# Patient Record
Sex: FEMALE | Race: WHITE | HISPANIC OR LATINO | Employment: OTHER | URBAN - METROPOLITAN AREA
[De-identification: names, ages, dates, MRNs, and addresses within clinical notes are randomized per-mention and may not be internally consistent; named-entity substitution may affect disease eponyms.]

---

## 2017-09-16 ENCOUNTER — OFFICE VISIT (OUTPATIENT)
Dept: URGENT CARE | Facility: URGENT CARE | Age: 52
End: 2017-09-16
Payer: COMMERCIAL

## 2017-09-16 VITALS
HEART RATE: 69 BPM | WEIGHT: 203 LBS | BODY MASS INDEX: 34.66 KG/M2 | OXYGEN SATURATION: 97 % | TEMPERATURE: 98.7 F | SYSTOLIC BLOOD PRESSURE: 186 MMHG | HEIGHT: 64 IN | DIASTOLIC BLOOD PRESSURE: 106 MMHG

## 2017-09-16 DIAGNOSIS — J30.1 ACUTE SEASONAL ALLERGIC RHINITIS DUE TO POLLEN: Primary | ICD-10-CM

## 2017-09-16 DIAGNOSIS — I10 ESSENTIAL HYPERTENSION: ICD-10-CM

## 2017-09-16 PROCEDURE — 99213 OFFICE O/P EST LOW 20 MIN: CPT | Performed by: PHYSICIAN ASSISTANT

## 2017-09-16 RX ORDER — CETIRIZINE HYDROCHLORIDE 10 MG/1
10 TABLET ORAL DAILY PRN
Qty: 30 TABLET | Refills: 11 | Status: SHIPPED | OUTPATIENT
Start: 2017-09-16 | End: 2018-10-10

## 2017-09-16 RX ORDER — LISINOPRIL 20 MG/1
20 TABLET ORAL DAILY
Qty: 30 TABLET | Refills: 11 | Status: SHIPPED | OUTPATIENT
Start: 2017-09-16 | End: 2018-10-01

## 2017-09-16 RX ORDER — FLUTICASONE PROPIONATE 50 MCG
2 SPRAY, SUSPENSION (ML) NASAL DAILY
Qty: 1 BOTTLE | Refills: 11 | Status: SHIPPED | OUTPATIENT
Start: 2017-09-16 | End: 2018-10-10

## 2017-09-16 NOTE — MR AVS SNAPSHOT
"              After Visit Summary   9/16/2017    Catia James    MRN: [id number]           Patient Information     Date Of Birth          1965        Visit Information        Provider Department      9/16/2017 7:20 PM Alejandro Trevino PA-C [facility]        Today's Diagnoses     Acute seasonal allergic rhinitis due to pollen    -  1    Essential hypertension           Follow-ups after your visit        Who to contact     If you have questions or need follow up information about today's clinic visit or your schedule please contact [facility] directly at [phone].  Normal or non-critical lab and imaging results will be communicated to you by Kognitiohart, letter or phone within 4 business days after the clinic has received the results. If you do not hear from us within 7 days, please contact the clinic through Kognitiohart or phone. If you have a critical or abnormal lab result, we will notify you by phone as soon as possible.  Submit refill requests through Insmed or call your pharmacy and they will forward the refill request to us. Please allow 3 business days for your refill to be completed.          Additional Information About Your Visit        MyChart Information     Insmed gives you secure access to your electronic health record. If you see a primary care provider, you can also send messages to your care team and make appointments. If you have questions, please call your primary care clinic.  If you do not have a primary care provider, please call [phone] and they will assist you.        Care EveryWhere ID     This is your Care EveryWhere ID. This could be used by other organizations to access your [place] medical records  [id number]        Your Vitals Were     Pulse Temperature Height Last Period Pulse Oximetry Breastfeeding?    69 98.7  F (37.1  C) (Oral) 5' 3.5\" (1.613 m) 08/03/2015 97% No    BMI (Body Mass Index)                   35.4 kg/m2         "    Blood Pressure from Last 3 Encounters:   09/16/17 (!) 186/106   09/08/15 (!) 138/110   12/17/14 (!) 142/98    Weight from Last 3 Encounters:   09/16/17 203 lb (92.1 kg)   09/08/15 203 lb 8 oz (92.3 kg)   12/17/14 194 lb (88 kg)              Today, you had the following     No orders found for display         Today's Medication Changes          These changes are accurate as of: 9/16/17  7:47 PM.  If you have any questions, ask your nurse or doctor.               Start taking these medicines.        Dose/Directions    lisinopril 20 MG tablet   Commonly known as:  PRINIVIL/ZESTRIL   Used for:  Acute seasonal allergic rhinitis due to pollen, Essential hypertension   Started by:  Alejandro Trevino PA-C        Dose:  20 mg   Take 1 tablet (20 mg) by mouth daily   Quantity:  30 tablet   Refills:  11         These medicines have changed or have updated prescriptions.        Dose/Directions    fluticasone 50 MCG/ACT spray   Commonly known as:  FLONASE   This may have changed:  when to take this   Used for:  Acute seasonal allergic rhinitis due to pollen   Changed by:  Alejandro Trevino PA-C        Dose:  2 spray   Spray 2 sprays into both nostrils daily   Quantity:  1 Bottle   Refills:  11            Where to get your medicines      These medications were sent to [facility] [address] [place] [address] & [address] [place] [id number]    Hours:  24-hours Phone:  [phone]     cetirizine 10 MG tablet    fluticasone 50 MCG/ACT spray    lisinopril 20 MG tablet                Primary Care Provider Office Phone # Fax #    Petty Joe Chun PA-C [phone] [address] [facility] [address]        Equal Access to Services     CONOR BOLES : Chloe Saldana, waaxda luqadaha, qaybta kaalmada adeegyada, cindy rodriguez. [facility] [phone].    ATENCIÓN: Si yury grant  lemus disposición servicios gratuitos de asistencia lingüística. Gautam polk [phone].    We comply with applicable federal civil rights laws and [place] laws. We do not discriminate on the basis of race, color, national origin, age, disability sex, sexual orientation or gender identity.            Thank you!     Thank you for choosing [facility]  for your care. Our goal is always to provide you with excellent care. Hearing back from our patients is one way we can continue to improve our services. Please take a few minutes to complete the written survey that you may receive in the mail after your visit with us. Thank you!             Your Updated Medication List - Protect others around you: Learn how to safely use, store and throw away your medicines at www.disposemymeds.org.          This list is accurate as of: 9/16/17  7:47 PM.  Always use your most recent med list.                   Brand Name Dispense Instructions for use Diagnosis    ADVIL COLD/SINUS PO      Take by mouth as needed        ADVIL PO      Take by mouth as needed        cetirizine 10 MG tablet    zyrTEC    30 tablet    Take 1 tablet (10 mg) by mouth daily as needed for allergies Prn    Acute seasonal allergic rhinitis due to pollen       cyclobenzaprine 5 MG tablet    FLEXERIL    30 tablet    Take 1 tablet (5 mg) by mouth 3 times daily as needed for muscle spasms    Ankle sprain, left, initial encounter, Foot injury, left, initial encounter       fluticasone 50 MCG/ACT spray    FLONASE    1 Bottle    Spray 2 sprays into both nostrils daily    Acute seasonal allergic rhinitis due to pollen       lisinopril 20 MG tablet    PRINIVIL/ZESTRIL    30 tablet    Take 1 tablet (20 mg) by mouth daily    Acute seasonal allergic rhinitis due to pollen, Essential hypertension       mometasone 0.1 % cream    ELOCON    15 g    Apply sparingly to affected area twice daily for 14 days.  Do not apply to face.    Routine general medical  examination at a health care facility       * order for DME     1 Device    Equipment being ordered: left ankle brace    Ankle sprain, left, initial encounter       * order for DME     1 Device    Equipment being ordered: 1 pair of crutches    Ankle sprain, left, initial encounter, Foot injury, left, initial encounter       * Notice:  This list has 2 medication(s) that are the same as other medications prescribed for you. Read the directions carefully, and ask your doctor or other care provider to review them with you.

## 2017-09-17 NOTE — NURSING NOTE
"Chief Complaint   Patient presents with     Urgent Care     Cough     c/o cough for 2 days       Initial BP (!) 186/106  Pulse 69  Temp 98.7  F (37.1  C) (Oral)  Ht 5' 3.5\" (1.613 m)  Wt 203 lb (92.1 kg)  LMP 08/03/2015  SpO2 97%  Breastfeeding? No  BMI 35.4 kg/m2 Estimated body mass index is 35.4 kg/(m^2) as calculated from the following:    Height as of this encounter: 5' 3.5\" (1.613 m).    Weight as of this encounter: 203 lb (92.1 kg).  Medication Reconciliation: complete   Bria Lewis MA    "

## 2017-09-17 NOTE — PROGRESS NOTES
"SUBJECTIVE:   Catia James is a 52 year old female presenting with a chief complaint of cough .  Onset of symptoms was 2 day(s) ago. Some post nasal drainage that she uses sudafed to control chronically.   Course of illness is same.  History of seasonal allergies. Did go back and start smoking cigarettes again. Has not been in clinic for 2 years. \"I hate going to doctors\". Mom has diabetes.   Severity mild  Current and Associated symptoms: nasal congestion  Treatment measures tried include OTC meds.  Predisposing factors include tobacco abuse.  She also is having quite elevated blood pressure with history of past elevation without treatment.     Past Medical History:   Diagnosis Date     Allergies      Current Outpatient Prescriptions   Medication Sig Dispense Refill     mometasone (ELOCON) 0.1 % cream Apply sparingly to affected area twice daily for 14 days.  Do not apply to face. (Patient not taking: Reported on 9/16/2017) 15 g 0     fluticasone (FLONASE) 50 MCG/ACT nasal spray Spray 2 sprays into both nostrils       Pseudoephedrine-Ibuprofen (ADVIL COLD/SINUS PO) Take by mouth as needed       Ibuprofen (ADVIL PO) Take by mouth as needed       ORDER FOR DME Equipment being ordered: left ankle brace (Patient not taking: Reported on 9/16/2017) 1 Device 0     cyclobenzaprine (FLEXERIL) 5 MG tablet Take 1 tablet (5 mg) by mouth 3 times daily as needed for muscle spasms (Patient not taking: Reported on 9/16/2017) 30 tablet 0     ORDER FOR DME Equipment being ordered: 1 pair of crutches (Patient not taking: Reported on 9/16/2017) 1 Device 0     cetirizine (ZYRTEC) 10 MG tablet Take 1 tablet by mouth daily as needed for allergies. Prn   30 tablet 1     Social History   Substance Use Topics     Smoking status: Former Smoker     Packs/day: 0.50     Years: 25.00     Types: Cigarettes     Quit date: 7/28/2014     Smokeless tobacco: Never Used     Alcohol use Yes      Comment: 3 mixed drinks a week " "      ROS:  CONSTITUTIONAL:NEGATIVE for fever, chills, change in weight  EYES: NEGATIVE for vision changes or irritation  ENT/MOUTH: NEGATIVE for ear, mouth and throat problems  RESP:POSITIVE for cough-productive    OBJECTIVE  :BP (!) 186/106  Pulse 69  Temp 98.7  F (37.1  C) (Oral)  Ht 5' 3.5\" (1.613 m)  Wt 203 lb (92.1 kg)  LMP 08/03/2015  SpO2 97%  Breastfeeding? No  BMI 35.4 kg/m2  GENERAL APPEARANCE: healthy, alert and no distress  EYES: EOMI,  PERRL, conjunctiva clear  HENT: ear canals and TM's normal.  Nose and mouth without ulcers, erythema or lesions  NECK: supple, nontender, no lymphadenopathy  RESP: lungs clear to auscultation - no rales, rhonchi or wheezes  CV: regular rates and rhythm, normal S1 S2, no murmur noted  ABDOMEN:  soft, nontender, no HSM or masses and bowel sounds normal  NEURO: Normal strength and tone, sensory exam grossly normal,  normal speech and mentation  SKIN: no suspicious lesions or rashes    ASSESSMENT:    1. Acute seasonal allergic rhinitis due to pollen    - cetirizine (ZYRTEC) 10 MG tablet; Take 1 tablet (10 mg) by mouth daily as needed for allergies Prn  Dispense: 30 tablet; Refill: 11  - fluticasone (FLONASE) 50 MCG/ACT spray; Spray 2 sprays into both nostrils daily  Dispense: 1 Bottle; Refill: 11  - lisinopril (PRINIVIL/ZESTRIL) 20 MG tablet; Take 1 tablet (20 mg) by mouth daily  Dispense: 30 tablet; Refill: 11    2. Essential hypertension  start  - lisinopril (PRINIVIL/ZESTRIL) 20 MG tablet; Take 1 tablet (20 mg) by mouth daily  Dispense: 30 tablet; Refill: 11    PLAN: start fluticasone nasal spray and cetirizine daily.   Use sudafed rarely. Start lisinopril 20 mg daily. Monitor blood pressures and follow up in primary clinic in 1-2 weeks.   See orders in Epic    "

## 2017-09-22 PROBLEM — I10 HYPERTENSION, GOAL BELOW 140/90: Status: ACTIVE | Noted: 2017-09-22

## 2017-09-25 ENCOUNTER — TELEPHONE (OUTPATIENT)
Dept: FAMILY MEDICINE | Facility: CLINIC | Age: 52
End: 2017-09-25

## 2017-09-25 ENCOUNTER — OFFICE VISIT (OUTPATIENT)
Dept: FAMILY MEDICINE | Facility: CLINIC | Age: 52
End: 2017-09-25
Payer: COMMERCIAL

## 2017-09-25 VITALS
DIASTOLIC BLOOD PRESSURE: 88 MMHG | WEIGHT: 192 LBS | BODY MASS INDEX: 33.48 KG/M2 | TEMPERATURE: 98.8 F | OXYGEN SATURATION: 99 % | HEART RATE: 59 BPM | RESPIRATION RATE: 12 BRPM | SYSTOLIC BLOOD PRESSURE: 130 MMHG

## 2017-09-25 DIAGNOSIS — Z13.6 ENCOUNTER FOR SCREENING FOR CARDIOVASCULAR DISORDERS: ICD-10-CM

## 2017-09-25 DIAGNOSIS — R79.89 ELEVATED LIVER FUNCTION TESTS: Primary | ICD-10-CM

## 2017-09-25 DIAGNOSIS — I10 HYPERTENSION, GOAL BELOW 140/90: Primary | ICD-10-CM

## 2017-09-25 DIAGNOSIS — Z23 NEED FOR PROPHYLACTIC VACCINATION AND INOCULATION AGAINST INFLUENZA: ICD-10-CM

## 2017-09-25 DIAGNOSIS — Z11.59 NEED FOR HEPATITIS C SCREENING TEST: ICD-10-CM

## 2017-09-25 LAB
ALBUMIN SERPL-MCNC: 4 G/DL (ref 3.4–5)
ALP SERPL-CCNC: 92 U/L (ref 40–150)
ALT SERPL W P-5'-P-CCNC: 172 U/L (ref 0–50)
ANION GAP SERPL CALCULATED.3IONS-SCNC: 7 MMOL/L (ref 3–14)
AST SERPL W P-5'-P-CCNC: 127 U/L (ref 0–45)
BILIRUB SERPL-MCNC: 0.4 MG/DL (ref 0.2–1.3)
BUN SERPL-MCNC: 11 MG/DL (ref 7–30)
CALCIUM SERPL-MCNC: 9.8 MG/DL (ref 8.5–10.1)
CHLORIDE SERPL-SCNC: 108 MMOL/L (ref 94–109)
CHOLEST SERPL-MCNC: 139 MG/DL
CO2 SERPL-SCNC: 27 MMOL/L (ref 20–32)
CREAT SERPL-MCNC: 0.61 MG/DL (ref 0.52–1.04)
CREAT UR-MCNC: 106 MG/DL
GFR SERPL CREATININE-BSD FRML MDRD: >90 ML/MIN/1.7M2
GLUCOSE SERPL-MCNC: 110 MG/DL (ref 70–99)
HCV AB SERPL QL IA: NONREACTIVE
HDLC SERPL-MCNC: 47 MG/DL
LDLC SERPL CALC-MCNC: 71 MG/DL
MICROALBUMIN UR-MCNC: 16 MG/L
MICROALBUMIN/CREAT UR: 15.19 MG/G CR (ref 0–25)
NONHDLC SERPL-MCNC: 92 MG/DL
POTASSIUM SERPL-SCNC: 4.9 MMOL/L (ref 3.4–5.3)
PROT SERPL-MCNC: 8 G/DL (ref 6.8–8.8)
SODIUM SERPL-SCNC: 142 MMOL/L (ref 133–144)
TRIGL SERPL-MCNC: 107 MG/DL

## 2017-09-25 PROCEDURE — 90686 IIV4 VACC NO PRSV 0.5 ML IM: CPT | Performed by: FAMILY MEDICINE

## 2017-09-25 PROCEDURE — 80061 LIPID PANEL: CPT | Performed by: FAMILY MEDICINE

## 2017-09-25 PROCEDURE — 82043 UR ALBUMIN QUANTITATIVE: CPT | Performed by: FAMILY MEDICINE

## 2017-09-25 PROCEDURE — 99214 OFFICE O/P EST MOD 30 MIN: CPT | Mod: 25 | Performed by: FAMILY MEDICINE

## 2017-09-25 PROCEDURE — 36415 COLL VENOUS BLD VENIPUNCTURE: CPT | Performed by: FAMILY MEDICINE

## 2017-09-25 PROCEDURE — 86803 HEPATITIS C AB TEST: CPT | Performed by: FAMILY MEDICINE

## 2017-09-25 PROCEDURE — 80053 COMPREHEN METABOLIC PANEL: CPT | Performed by: FAMILY MEDICINE

## 2017-09-25 PROCEDURE — 90471 IMMUNIZATION ADMIN: CPT | Performed by: FAMILY MEDICINE

## 2017-09-25 NOTE — TELEPHONE ENCOUNTER
Patient given results as below.  Was advised to repeat LFTs in 2 weeks  Patient will need to call  to that time when she has her schedule.  Valentina Lucero RN

## 2017-09-25 NOTE — MR AVS SNAPSHOT
"              After Visit Summary   9/25/2017    Catia James    MRN: [id number]           Patient Information     Date Of Birth          1965        Visit Information        Provider Department      9/25/2017 9:00 AM Cristel Jennings MD [facility]        Today's Diagnoses     Hypertension, goal below 140/90    -  1    Encounter for screening for cardiovascular disorders        Need for hepatitis C screening test        Need for prophylactic vaccination and inoculation against influenza          Care Instructions    Blood pressure better continue lisinopril 20 mg daily   Labs today   Flu shot today   Due for mammogram and colonoscopy   Recheck blood pressure with your primary Michelle Chun in 3 months   Recommended low salt diet, wt loss, exercise.   Eating a healthy diet can improve your health by reducing your risk for heart and vascular disease.  It can help you maintain a healthy weight which in turn decreases your risk for additional problems including diabetes and arthritis.  Eating well can improve your concentration and memory.  You'll also feel better.  Follow the advice of author Alejandro Richardson (In Defense of Food): \"Eat food.  Not too much.  Mostly plants.\"  Or follow the advice:  \"If it came from a plant, eat it.  If it was processed in a plant, don't.\"    A heart-healthy, Mediterranean Diet is low in saturated fat and includes the following:  Fruits and vegetables (fresh or frozen - especially berries and cruciferous vegetables)  Whole grains and legumes (whole grain bread, whole grain pasta, whole grain cereal, and brown rice)  Healthy fats:  Olive oil and Canola oil  Nuts (especially walnuts and almonds)  Fish  Avocado  Soy  Garlic  Dark chocolate    Foods to limit or avoid include:  Animal products and animal fats  Saturated fat (especially fried foods and trans fats)  Refined carbohydrates (white flour, white bread, white pasta, sugar, and white rice)  Red meat  Processed " "meat  Processed food including fast food    MYCHART SIGNUP FOR E-VISITS AND EASIER COMMUNICATION:  http://myhealth.[email or web address]     Zipnosis:  [email or web address].  Sign up for e-visits for common illnesses!     RADIOLOGY:   [facility]:  [phone] to schedule any radiology tests at [facility]: [phone]    Mammograms/colonoscopies:  [phone]    CONSUMER PRICE LINE for estimates of test costs:  [phone]     You can also call [phone] if you are uninsured or underinsured to see if you qualify for a reduced cost mammogram or colonoscopy.     Please consider using [facility] for better service and improved communication with your physician!            Follow-ups after your visit        Your next 10 appointments already scheduled     Sep 27, 2017  9:00 AM CDT   MA SCREENING DIGITAL BILATERAL with [id number]   [facility] ([facility])    [address] [place] [address], [address]  [facility] [address] [phone]           Do not use any powder, lotion or deodorant under your arms or on your breast. If you do, we will ask you to remove it before your exam.  Wear comfortable, two-piece clothing.  If you have any allergies, tell your care team.  Bring any previous mammograms from other facilities or have them mailed to the breast center. Three-dimensional (3D) mammograms are available at [facility] locations in [facility], [place], [facility], [place], [place], and [place]. Health locations include [place] [facility] in [place]. Benefits of 3D mammograms include: - Improved rate of cancer detection - Decreases your chance of having to go back for more tests, which means fewer: - \"False-positive\" results (This means that there is an abnormal area but it isn't cancer.) - Invasive testing procedures, such as a biopsy or surgery - Can provide clearer images of the breast if you have dense breast " tissue. 3D mammography is an optional exam that anyone can have with a 2D mammogram. It doesn't replace or take the place of a 2D mammogram. 2D mammograms remain an effective screening test for all women.  Not all insurance companies cover the cost of a 3D mammogram. Check with your insurance.              Who to contact     If you have questions or need follow up information about today's clinic visit or your schedule please contact [facility] directly at [phone].  Normal or non-critical lab and imaging results will be communicated to you by Local Energy Technologieshart, letter or phone within 4 business days after the clinic has received the results. If you do not hear from us within 7 days, please contact the clinic through Jumbas or phone. If you have a critical or abnormal lab result, we will notify you by phone as soon as possible.  Submit refill requests through Jumbas or call your pharmacy and they will forward the refill request to us. Please allow 3 business days for your refill to be completed.          Additional Information About Your Visit        Jumbas Information     Jumbas gives you secure access to your electronic health record. If you see a primary care provider, you can also send messages to your care team and make appointments. If you have questions, please call your primary care clinic.  If you do not have a primary care provider, please call [phone] and they will assist you.        Care EveryWhere ID     This is your Care EveryWhere ID. This could be used by other organizations to access your [place] medical records  [id number]        Your Vitals Were     Pulse Temperature Respirations Last Period Pulse Oximetry BMI (Body Mass Index)    59 98.8  F (37.1  C) (Oral) 12 08/03/2015 99% 33.48 kg/m2       Blood Pressure from Last 3 Encounters:   09/25/17 130/88   09/16/17 (!) 186/106   09/08/15 (!) 138/110    Weight from Last 3 Encounters:   09/25/17 192 lb (87.1 kg)   09/16/17 203 lb  (92.1 kg)   09/08/15 203 lb 8 oz (92.3 kg)              We Performed the Following     Albumin Random Urine Quantitative with Creat Ratio     Comprehensive metabolic panel     HC FLU VAC PRESRV FREE QUAD SPLIT VIR 3+YRS IM     Hepatitis C Screen Reflex to HCV RNA Quant and Genotype     Lipid panel reflex to direct LDL     VACCINE ADMINISTRATION, INITIAL          Today's Medication Changes          These changes are accurate as of: 9/25/17  9:13 AM.  If you have any questions, ask your nurse or doctor.               Stop taking these medicines if you haven't already. Please contact your care team if you have questions.     cyclobenzaprine 5 MG tablet   Commonly known as:  FLEXERIL   Stopped by:  Cristel Jennings MD                    Primary Care Provider Office Phone # Fax #    Petty Chun PA-C [phone] [address]  [facility] [address]        Equal Access to Services     NORM BOLES : Hadii brittaney mackey hadasho Soherber, waaxda luqadaha, qaybta kaalmada edna, cindy cristobal . So [facility] [phone].    ATENCIÓN: Si habla español, tiene a lemus disposición servicios gratuitos de asistencia lingüística. Gautam al [phone].    We comply with applicable federal civil rights laws and [place] laws. We do not discriminate on the basis of race, color, national origin, age, disability sex, sexual orientation or gender identity.            Thank you!     Thank you for choosing [facility]  for your care. Our goal is always to provide you with excellent care. Hearing back from our patients is one way we can continue to improve our services. Please take a few minutes to complete the written survey that you may receive in the mail after your visit with us. Thank you!             Your Updated Medication List - Protect others around you: Learn how to safely use, store and throw away your medicines at www.disposemymeds.org.          This list is  accurate as of: 9/25/17  9:13 AM.  Always use your most recent med list.                   Brand Name Dispense Instructions for use Diagnosis    ADVIL PO      Take by mouth as needed        cetirizine 10 MG tablet    zyrTEC    30 tablet    Take 1 tablet (10 mg) by mouth daily as needed for allergies Prn    Acute seasonal allergic rhinitis due to pollen       fluticasone 50 MCG/ACT spray    FLONASE    1 Bottle    Spray 2 sprays into both nostrils daily    Acute seasonal allergic rhinitis due to pollen       lisinopril 20 MG tablet    PRINIVIL/ZESTRIL    30 tablet    Take 1 tablet (20 mg) by mouth daily    Acute seasonal allergic rhinitis due to pollen, Essential hypertension

## 2017-09-25 NOTE — TELEPHONE ENCOUNTER
Lipids normal except hdl low but liver test elevated . Not sure if has had this before, dont see any recent liver test. Can be from fat deposited in liver can be from infection or  something else. unlikely to be from current meds   Recommend avoiding alcohol and tylenol until figure out more  Recommend she recheck liver tests in 2 weeks to see where its trending and base don results may need further labs and or imaging.   Hep c pending but if still elevated in future would recommend doing hep b ,& a titers and doing u/s of liver .

## 2017-09-25 NOTE — NURSING NOTE
"Chief Complaint   Patient presents with     Urgent Care     follow-up       Initial /88 (BP Location: Right arm, Patient Position: Chair, Cuff Size: Adult Large)  Pulse 59  Temp 98.8  F (37.1  C) (Oral)  Resp 12  Wt 192 lb (87.1 kg)  LMP 08/03/2015  SpO2 99%  BMI 33.48 kg/m2 Estimated body mass index is 33.48 kg/(m^2) as calculated from the following:    Height as of 9/16/17: 5' 3.5\" (1.613 m).    Weight as of this encounter: 192 lb (87.1 kg).  Medication Reconciliation: complete     Kate Schwartz, CMA    "

## 2017-09-25 NOTE — TELEPHONE ENCOUNTER
Writer called patient left non detailed message requesting return call to clinic and ask to speak with nurse    Jodee Abdi RN

## 2017-09-25 NOTE — PATIENT INSTRUCTIONS
"Blood pressure better continue lisinopril 20 mg daily   Labs today   Flu shot today   Due for mammogram and colonoscopy   Recheck blood pressure with your primary Michelle Chun in 3 months   Recommended low salt diet, wt loss, exercise.   Eating a healthy diet can improve your health by reducing your risk for heart and vascular disease.  It can help you maintain a healthy weight which in turn decreases your risk for additional problems including diabetes and arthritis.  Eating well can improve your concentration and memory.  You'll also feel better.  Follow the advice of author Alejandro Richardson (In Defense of Food): \"Eat food.  Not too much.  Mostly plants.\"  Or follow the advice:  \"If it came from a plant, eat it.  If it was processed in a plant, don't.\"    A heart-healthy, Mediterranean Diet is low in saturated fat and includes the following:  Fruits and vegetables (fresh or frozen - especially berries and cruciferous vegetables)  Whole grains and legumes (whole grain bread, whole grain pasta, whole grain cereal, and brown rice)  Healthy fats:  Olive oil and Canola oil  Nuts (especially walnuts and almonds)  Fish  Avocado  Soy  Garlic  Dark chocolate    Foods to limit or avoid include:  Animal products and animal fats  Saturated fat (especially fried foods and trans fats)  Refined carbohydrates (white flour, white bread, white pasta, sugar, and white rice)  Red meat  Processed meat  Processed food including fast food    JAMEST SIGNUP FOR E-VISITS AND EASIER COMMUNICATION:  [email or web address]     Zipnosis:  [email or web address].  Sign up for e-visits for common illnesses!     RADIOLOGY:   [facility]:  [phone] to schedule any radiology tests at [facility]: [phone]    Mammograms/colonoscopies:  [phone]    CONSUMER PRICE LINE for estimates of test costs:  [phone]     You can also call [phone] if you are uninsured or underinsured to see if you qualify for " a reduced cost mammogram or colonoscopy.     Please consider using [facility] for better service and improved communication with your physician!

## 2017-09-25 NOTE — PROGRESS NOTES
Corona Green Jacob,  Your results came back and are within acceptable limits. -Microalbumin (urine protein) test is normal.  ADVISE: recheck annually. If you have any further concerns please do not hesitate to contact us by message, phone or making an appointment.  Have a good day   Dr Dick HOFFMAN

## 2017-09-25 NOTE — PROGRESS NOTES
Injectable Influenza Immunization Documentation    1.  Is the person to be vaccinated sick today?   No    2. Does the person to be vaccinated have an allergy to a component   of the vaccine?   No    3. Has the person to be vaccinated ever had a serious reaction   to influenza vaccine in the past?   No    4. Has the person to be vaccinated ever had Guillain-Barré syndrome?   No    Form completed by Kate Schwartz [facility]

## 2017-09-25 NOTE — PROGRESS NOTES
SUBJECTIVE:   Catia James is a 52 year old female who presents to clinic today for the following health issues:      ED/UC Follow up:    [place]  [facility] [place] of [place]: 9/16/17  Reason for visit: acute seasonal rhinitis due to pollen  Current Status: still congested, scratchy voice, intermittent cough     Hypertension Follow-up  Initially was systolic of 150     Outpatient blood pressures are not being checked.    Low Salt Diet: no added salt    Desires flu shot     Working on better diet , weight loss     Problem list and histories reviewed & adjusted, as indicated.  Additional history: as documented    Patient Active Problem List   Diagnosis     Allergic rhinitis     Bump     Hypertension, goal below 140/90     BMI 33.0-33.9,adult     Past Surgical History:   Procedure Laterality Date     HC REMOVAL GALLBLADDER  1-04     SURGICAL HISTORY OF -        had Vasectomy       Social History   Substance Use Topics     Smoking status: Former Smoker     Packs/day: 0.50     Years: 25.00     Types: Cigarettes     Quit date: 7/28/2014     Smokeless tobacco: Never Used     Alcohol use Yes      Comment: 3 mixed drinks a week     Family History   Problem Relation Age of Onset     DIABETES Mother      Family History Negative Father      Family History Negative Maternal Grandmother      Family History Negative Maternal Grandfather      Family History Negative Paternal Grandmother      Family History Negative Paternal Grandfather          Current Outpatient Prescriptions   Medication Sig Dispense Refill     cetirizine (ZYRTEC) 10 MG tablet Take 1 tablet (10 mg) by mouth daily as needed for allergies Prn 30 tablet 11     fluticasone (FLONASE) 50 MCG/ACT spray Spray 2 sprays into both nostrils daily 1 Bottle 11     lisinopril (PRINIVIL/ZESTRIL) 20 MG tablet Take 1 tablet (20 mg) by mouth daily 30 tablet 11     Ibuprofen (ADVIL PO) Take by mouth as needed       Allergies   Allergen Reactions     Animal Dander      Dust Mites       Erythromycin      Recent Labs   Lab Test  09/08/15   1143  09/08/15   1130  06/03/13   1051   A1C   --   5.9   --    LDL  85   --   80   HDL  50*   --   54   TRIG  130   --   132      BP Readings from Last 3 Encounters:   09/25/17 130/88   09/16/17 (!) 186/106   09/08/15 (!) 138/110    Wt Readings from Last 3 Encounters:   09/25/17 192 lb (87.1 kg)   09/16/17 203 lb (92.1 kg)   09/08/15 203 lb 8 oz (92.3 kg)                  Labs reviewed in EPIC          Reviewed and updated as needed this visit by clinical staff       Reviewed and updated as needed this visit by Provider         ROS:  C: NEGATIVE for fever, chills, change in weight  I: NEGATIVE for worrisome rashes, moles or lesions  E: NEGATIVE for vision changes or irritation  E/M: NEGATIVE for ear, mouth and throat problems  R: NEGATIVE for significant cough or SOB  B: NEGATIVE for masses, tenderness or discharge  CV: NEGATIVE for chest pain, palpitations or peripheral edema  GI: NEGATIVE for nausea, abdominal pain, heartburn, or change in bowel habits  : NEGATIVE for frequency, dysuria, or hematuria  M: NEGATIVE for significant arthralgias or myalgia  N: NEGATIVE for weakness, dizziness or paresthesias  E: NEGATIVE for temperature intolerance, skin/hair changes  H: NEGATIVE for bleeding problems  P: NEGATIVE for changes in mood or affect    OBJECTIVE:     /88 (BP Location: Right arm, Patient Position: Chair, Cuff Size: Adult Large)  Pulse 59  Temp 98.8  F (37.1  C) (Oral)  Resp 12  Wt 192 lb (87.1 kg)  LMP 08/03/2015  SpO2 99%  BMI 33.48 kg/m2  Body mass index is 33.48 kg/(m^2).  GENERAL: healthy, alert and no distress  EYES: Eyes grossly normal to inspection, PERRL and conjunctivae and sclerae normal  HENT: normal cephalic/atraumatic, ear canals and TM's normal, nose and mouth without ulcers or lesions, nasal mucosa edematous , rhinorrhea clear, oropharynx clear, oral mucous membranes moist and sinuses: not tender  NECK: no adenopathy, no  asymmetry, masses, or scars and thyroid normal to palpation  RESP: lungs clear to auscultation - no rales, rhonchi or wheezes  CV: regular rate and rhythm, normal S1 S2, no S3 or S4, no murmur, click or rub, no peripheral edema and peripheral pulses strong  ABDOMEN: soft, non tender, no hepatosplenomegaly, no masses and bowel sounds normal  MS: no gross musculoskeletal defects noted, no edema  SKIN: no suspicious lesions or rashes  NEURO: Normal strength and tone, mentation intact and speech normal  PSYCH: mentation appears normal, affect normal/bright    Diagnostic Test Results:  No results found for this or any previous visit (from the past 24 hour(s)).    ASSESSMENT/PLAN:   Former smoker, HTN on Lisinopril,  allergic rhinitis on Zyrtec and Flonase, hx of UTI, prior cholecystectomy, seen 9/16/17 for allergies and started on Lisinopril. No show 9/22. [place]  negative. Here for follow up on urgent care and BP    1. Hypertension, goal below 140/90  Blood pressure better continue lisinopril 20 mg daily. Labs today. Flu shot today. Due for mammogram and colonoscopy. Recheck blood pressure with your primary Michelle Chun in 3 months   - Comprehensive metabolic panel  - Albumin Random Urine Quantitative with Creat Ratio  - Lipid panel reflex to direct LDL    2. Encounter for screening for cardiovascular disorders  - Lipid panel reflex to direct LDL    3. Need for hepatitis C screening test  - Hepatitis C Screen Reflex to HCV RNA Quant and Genotype    4. Need for prophylactic vaccination and inoculation against influenza  - VACCINE ADMINISTRATION, INITIAL  - HC FLU VAC PRESRV FREE QUAD SPLIT VIR 3+YRS IM    5. BMI 33.0-33.9,adult  Diet, exercise weight loss.      See Patient Instructions  Patient Instructions   Blood pressure better continue lisinopril 20 mg daily   Labs today   Flu shot today   Due for mammogram and colonoscopy   Recheck blood pressure with your primary Michelle Chun in 3 months   Recommended low  "salt diet, wt loss, exercise.   Eating a healthy diet can improve your health by reducing your risk for heart and vascular disease.  It can help you maintain a healthy weight which in turn decreases your risk for additional problems including diabetes and arthritis.  Eating well can improve your concentration and memory.  You'll also feel better.  Follow the advice of author Alejandro Richardson (In Defense of Food): \"Eat food.  Not too much.  Mostly plants.\"  Or follow the advice:  \"If it came from a plant, eat it.  If it was processed in a plant, don't.\"    A heart-healthy, Mediterranean Diet is low in saturated fat and includes the following:  Fruits and vegetables (fresh or frozen - especially berries and cruciferous vegetables)  Whole grains and legumes (whole grain bread, whole grain pasta, whole grain cereal, and brown rice)  Healthy fats:  Olive oil and Canola oil  Nuts (especially walnuts and almonds)  Fish  Avocado  Soy  Garlic  Dark chocolate    Foods to limit or avoid include:  Animal products and animal fats  Saturated fat (especially fried foods and trans fats)  Refined carbohydrates (white flour, white bread, white pasta, sugar, and white rice)  Red meat  Processed meat  Processed food including fast food    MYCHART SIGNUP FOR E-VISITS AND EASIER COMMUNICATION:  [email or web address]     Zipnosis:  [facility].  Sign up for e-visits for common illnesses!     RADIOLOGY:   [facility]:  [phone] to schedule any radiology tests at [facility]: [phone]    Mammograms/colonoscopies:  [phone]    CONSUMER PRICE LINE for estimates of test costs:  [phone]     You can also call [phone] if you are uninsured or underinsured to see if you qualify for a reduced cost mammogram or colonoscopy.     Please consider using [facility] for better service and improved communication with your physician!        Cristel Jennings MD  [facility]    "

## 2017-09-26 NOTE — PROGRESS NOTES
[place] Ms. James,  Your results came back and are within acceptable limits. -Hepatitis C antibody screen test shows no signs of a previous hepatitis C infection.. If you have any further concerns please do not hesitate to contact us by message, phone or making an appointment.  Have a good day   Dr Dick HOFFMAN

## 2017-09-27 ENCOUNTER — HOSPITAL ENCOUNTER (OUTPATIENT)
Dept: MAMMOGRAPHY | Facility: CLINIC | Age: 52
Discharge: HOME | End: 2017-09-27
Payer: COMMERCIAL

## 2017-09-27 DIAGNOSIS — Z00.00 PREVENTATIVE HEALTH CARE: ICD-10-CM

## 2017-09-27 PROCEDURE — G0202 SCR MAMMO BI INCL CAD: HCPCS

## 2017-10-09 ENCOUNTER — TELEPHONE (OUTPATIENT)
Dept: FAMILY MEDICINE | Facility: CLINIC | Age: 52
End: 2017-10-09

## 2017-10-09 DIAGNOSIS — R79.89 ELEVATED LIVER FUNCTION TESTS: ICD-10-CM

## 2017-10-09 DIAGNOSIS — R79.89 ELEVATED LIVER FUNCTION TESTS: Primary | ICD-10-CM

## 2017-10-09 LAB
ALBUMIN SERPL-MCNC: 4.1 G/DL (ref 3.4–5)
ALP SERPL-CCNC: 88 U/L (ref 40–150)
ALT SERPL W P-5'-P-CCNC: 147 U/L (ref 0–50)
AST SERPL W P-5'-P-CCNC: 92 U/L (ref 0–45)
BILIRUB DIRECT SERPL-MCNC: 0.1 MG/DL (ref 0–0.2)
BILIRUB SERPL-MCNC: 0.5 MG/DL (ref 0.2–1.3)
PROT SERPL-MCNC: 8.4 G/DL (ref 6.8–8.8)

## 2017-10-09 PROCEDURE — 80076 HEPATIC FUNCTION PANEL: CPT | Performed by: FAMILY MEDICINE

## 2017-10-09 PROCEDURE — 36415 COLL VENOUS BLD VENIPUNCTURE: CPT | Performed by: FAMILY MEDICINE

## 2017-10-09 NOTE — TELEPHONE ENCOUNTER
Called patient, reviewed message per below from Dr. Jennings.    Patient verbalized understanding and in agreement with plan.    Non-fasting lab appt scheduled on 10/13/17.  Appt date, time and location confirmed with patient.    MARAL JeanN, RN

## 2017-10-09 NOTE — TELEPHONE ENCOUNTER
lft elevated   Mildly improved  Recommend checking hep b titers and u/s liver  I put the orders in   Can come in  Few days to get done

## 2017-10-09 NOTE — TELEPHONE ENCOUNTER
Called home number but voicemail box full so unable to leave message.    Next, called mobile number and informed patient of message per below from Dr. Jennings, along with scheduling number for US.    Patient verbalized understanding and in agreement with plan.    Patient asked:  1. Is there anything she should or should NOT be doing in the meantime?    Dr. Jennings-Please advise.  Would you recommend patient avoid drinking alcoholic beverages and avoid using Tylenol?  Anything additional you would recommend?    Thank you!  MARAL JeanN, RN

## 2017-10-11 DIAGNOSIS — R79.89 ELEVATED LIVER FUNCTION TESTS: ICD-10-CM

## 2017-10-11 LAB
ALBUMIN SERPL-MCNC: 4 G/DL (ref 3.4–5)
ALP SERPL-CCNC: 93 U/L (ref 40–150)
ALT SERPL W P-5'-P-CCNC: 128 U/L (ref 0–50)
AST SERPL W P-5'-P-CCNC: 74 U/L (ref 0–45)
BILIRUB DIRECT SERPL-MCNC: 0.1 MG/DL (ref 0–0.2)
BILIRUB SERPL-MCNC: 0.3 MG/DL (ref 0.2–1.3)
HAV IGG SER QL IA: NONREACTIVE
HAV IGM SERPL QL IA: NONREACTIVE
HBV CORE AB SERPL QL IA: NONREACTIVE
HBV SURFACE AB SERPL IA-ACNC: 0.24 M[IU]/ML
HBV SURFACE AG SERPL QL IA: NONREACTIVE
PROT SERPL-MCNC: 7.8 G/DL (ref 6.8–8.8)

## 2017-10-11 PROCEDURE — 80076 HEPATIC FUNCTION PANEL: CPT | Performed by: FAMILY MEDICINE

## 2017-10-11 PROCEDURE — 86709 HEPATITIS A IGM ANTIBODY: CPT | Performed by: FAMILY MEDICINE

## 2017-10-11 PROCEDURE — 86704 HEP B CORE ANTIBODY TOTAL: CPT | Performed by: FAMILY MEDICINE

## 2017-10-11 PROCEDURE — 86707 HEPATITIS BE ANTIBODY: CPT | Mod: 90 | Performed by: FAMILY MEDICINE

## 2017-10-11 PROCEDURE — 86708 HEPATITIS A ANTIBODY: CPT | Performed by: FAMILY MEDICINE

## 2017-10-11 PROCEDURE — 36415 COLL VENOUS BLD VENIPUNCTURE: CPT | Performed by: FAMILY MEDICINE

## 2017-10-11 PROCEDURE — 99000 SPECIMEN HANDLING OFFICE-LAB: CPT | Performed by: FAMILY MEDICINE

## 2017-10-11 PROCEDURE — 87340 HEPATITIS B SURFACE AG IA: CPT | Performed by: FAMILY MEDICINE

## 2017-10-11 PROCEDURE — 86706 HEP B SURFACE ANTIBODY: CPT | Performed by: FAMILY MEDICINE

## 2017-10-11 PROCEDURE — 87350 HEPATITIS BE AG IA: CPT | Mod: 90 | Performed by: FAMILY MEDICINE

## 2017-10-11 NOTE — PROGRESS NOTES
[place] Ms. James,  Your results came back   Improving liver tests  So far negative for Hepatitis B and A   previously negative for C  Not immune to hep b so could consider vaccination against hep b in the future  Will wait to see what ultrasound shows when done. And make further recommendations then    If you have any further concerns please do not hesitate to contact us by message, phone or making an appointment.  Have a good day   Dr Dick HOFFMAN

## 2017-10-13 LAB
HBV E AB SERPL QL IA: NEGATIVE
HBV E AG SERPL QL IA: NEGATIVE

## 2017-10-13 NOTE — PROGRESS NOTES
Corona hBatiaSerafin James,  Your results came back and are within acceptable limits. Negative for hepatitis B . If you have any further concerns please do not hesitate to contact us by message, phone or making an appointment.  Have a good day   Dr Dick OHFFMAN

## 2018-01-13 ENCOUNTER — OFFICE VISIT (OUTPATIENT)
Dept: URGENT CARE | Facility: URGENT CARE | Age: 53
End: 2018-01-13
Payer: COMMERCIAL

## 2018-01-13 VITALS
DIASTOLIC BLOOD PRESSURE: 102 MMHG | OXYGEN SATURATION: 98 % | TEMPERATURE: 99.2 F | BODY MASS INDEX: 30.9 KG/M2 | HEART RATE: 105 BPM | HEIGHT: 64 IN | WEIGHT: 181 LBS | SYSTOLIC BLOOD PRESSURE: 154 MMHG

## 2018-01-13 DIAGNOSIS — A08.4 VIRAL GASTROENTERITIS: Primary | ICD-10-CM

## 2018-01-13 PROCEDURE — 99213 OFFICE O/P EST LOW 20 MIN: CPT | Performed by: PHYSICIAN ASSISTANT

## 2018-01-13 NOTE — MR AVS SNAPSHOT
"              After Visit Summary   1/13/2018    Catia James    MRN: [id number]           Patient Information     Date Of Birth          1965        Visit Information        Provider Department      1/13/2018 7:40 PM Anastasia Neri PA-C [facility]        Today's Diagnoses     Viral gastroenteritis    -  1       Follow-ups after your visit        Who to contact     If you have questions or need follow up information about today's clinic visit or your schedule please contact [facility] directly at [phone].  Normal or non-critical lab and imaging results will be communicated to you by ThingWorxhart, letter or phone within 4 business days after the clinic has received the results. If you do not hear from us within 7 days, please contact the clinic through Picwingt or phone. If you have a critical or abnormal lab result, we will notify you by phone as soon as possible.  Submit refill requests through [employer] or call your pharmacy and they will forward the refill request to us. Please allow 3 business days for your refill to be completed.          Additional Information About Your Visit        MyChart Information     [employer] gives you secure access to your electronic health record. If you see a primary care provider, you can also send messages to your care team and make appointments. If you have questions, please call your primary care clinic.  If you do not have a primary care provider, please call [phone] and they will assist you.        Care EveryWhere ID     This is your Care EveryWhere ID. This could be used by other organizations to access your [facility] medical records  [id number]        Your Vitals Were     Pulse Temperature Height Last Period Pulse Oximetry BMI (Body Mass Index)    105 99.2  F (37.3  C) (Oral) 5' 4\" (1.626 m) 08/03/2015 98% 31.07 kg/m2       Blood Pressure from Last 3 Encounters:   01/13/18 (!) 154/102   09/25/17 130/88   09/16/17 (!) " 186/106    Weight from Last 3 Encounters:   01/13/18 181 lb (82.1 kg)   09/25/17 192 lb (87.1 kg)   09/16/17 203 lb (92.1 kg)              Today, you had the following     No orders found for display       Primary Care Provider Office Phone # Fax #    Petty Chun PA-C [phone]       [address]  [facility] [address]        Equal Access to Services     CONOR BOLES : Hadii aad ku hadasho Soomaali, waaxda luqadaha, qaybta kaalmada adeegyada, waxay idiin hayaan adeeg kharash la'debra . So [facility] [phone].    ATENCIÓN: Si habla español, tiene a lemus disposición servicios gratuitos de asistencia lingüística. [facility] [phone].    We comply with applicable federal civil rights laws and [place] laws. We do not discriminate on the basis of race, color, national origin, age, disability, sex, sexual orientation, or gender identity.            Thank you!     Thank you for choosing [facility]  for your care. Our goal is always to provide you with excellent care. Hearing back from our patients is one way we can continue to improve our services. Please take a few minutes to complete the written survey that you may receive in the mail after your visit with us. Thank you!             Your Updated Medication List - Protect others around you: Learn how to safely use, store and throw away your medicines at www.disposemymeds.org.          This list is accurate as of: 1/13/18  8:44 PM.  Always use your most recent med list.                   Brand Name Dispense Instructions for use Diagnosis    ADVIL PO      Take by mouth as needed        cetirizine 10 MG tablet    zyrTEC    30 tablet    Take 1 tablet (10 mg) by mouth daily as needed for allergies Prn    Acute seasonal allergic rhinitis due to pollen       fluticasone 50 MCG/ACT spray    FLONASE    1 Bottle    Spray 2 sprays into both nostrils daily    Acute seasonal allergic rhinitis due to pollen       lisinopril  20 MG tablet    PRINIVIL/ZESTRIL    30 tablet    Take 1 tablet (20 mg) by mouth daily    Acute seasonal allergic rhinitis due to pollen, Essential hypertension       TYLENOL PO

## 2018-01-14 NOTE — NURSING NOTE
"Chief Complaint   Patient presents with     Urgent Care     URI     During night started to have stomach ache; chills/sweats this AM, has upset stomach, scratchy throat, vomiting and diarrhea, productive cough, body aches, eyes hurt.     Vomiting     Initial BP (!) 154/102  Pulse 105  Temp 99.2  F (37.3  C) (Oral)  Ht 5' 4\" (1.626 m)  Wt 181 lb (82.1 kg)  LMP 08/03/2015  SpO2 98%  BMI 31.07 kg/m2 Estimated body mass index is 31.07 kg/(m^2) as calculated from the following:    Height as of this encounter: 5' 4\" (1.626 m).    Weight as of this encounter: 181 lb (82.1 kg)..  BP completed using cuff size: jesica Crook RN  "

## 2018-01-14 NOTE — PROGRESS NOTES
"HPI:  Catia is a 51 yo female who presents for diarrhea q few hours and vomiting once x today.  Diarrhea is watery.  No blood in stool.  Reports headache and stomach ache/ discomfort which she describes as \"roiling\" stomach.  Denies abdominal pain.  No temperature not taken at home.  Also reports scratchy throat and mild \"clearing\" cough which she feels is due to her throat irritation.  Reports runny nose.  Patient works in a .    ROS:  See HPI      PE:  Vitals & nursing notes reviewed. B/P: 154/102, T: 99.2, P: 105, R: Data Unavailable  Constitutional:  Alert, well nourished, well-developed, NAD  Head:  Atraumatic, normocephalic  Eyes:  Perrla, EOMI, conjunctiva:  Pink   Sclera:  Anicteric  Ears:  Canals clear BL, TM pearly BL  Throat:  No erythema, exudates, or edema to postoropharynx  Neck:  Supple, no cervical LAD  Lungs:  CTA, no wheezes, rhonchi, or rales  CV:  RRR,  no murmur appreciated  Abdomen:  Soft, NTTP, No HSM, No CVA tenderness, (+) bowel sounds,      ASSESSMENT:  (A08.4) Viral gastroenteritis  (primary encounter diagnosis)  Plan: Rest.  Fluids.  BRAT diet.  Wash hands frequently.  Do not return to work at  until resolved.   Patient given printout about Gastroenteritis including home cares.  Tylenol or advil for pain & fever PRN   F/U with PCP if sx persist or worsen.          "

## 2018-03-08 ENCOUNTER — OFFICE VISIT (OUTPATIENT)
Dept: FAMILY MEDICINE | Facility: CLINIC | Age: 53
End: 2018-03-08
Payer: COMMERCIAL

## 2018-03-08 VITALS
TEMPERATURE: 98.1 F | DIASTOLIC BLOOD PRESSURE: 84 MMHG | SYSTOLIC BLOOD PRESSURE: 130 MMHG | OXYGEN SATURATION: 99 % | HEART RATE: 60 BPM | RESPIRATION RATE: 17 BRPM | WEIGHT: 190 LBS | BODY MASS INDEX: 32.61 KG/M2

## 2018-03-08 DIAGNOSIS — I10 HYPERTENSION, GOAL BELOW 140/90: Primary | ICD-10-CM

## 2018-03-08 DIAGNOSIS — R74.8 ELEVATED LIVER ENZYMES: ICD-10-CM

## 2018-03-08 LAB
ALBUMIN SERPL-MCNC: 3.9 G/DL (ref 3.4–5)
ALP SERPL-CCNC: 71 U/L (ref 40–150)
ALT SERPL W P-5'-P-CCNC: 41 U/L (ref 0–50)
ANION GAP SERPL CALCULATED.3IONS-SCNC: 4 MMOL/L (ref 3–14)
AST SERPL W P-5'-P-CCNC: 30 U/L (ref 0–45)
BILIRUB SERPL-MCNC: 0.4 MG/DL (ref 0.2–1.3)
BUN SERPL-MCNC: 18 MG/DL (ref 7–30)
CALCIUM SERPL-MCNC: 9.2 MG/DL (ref 8.5–10.1)
CHLORIDE SERPL-SCNC: 105 MMOL/L (ref 94–109)
CO2 SERPL-SCNC: 29 MMOL/L (ref 20–32)
CREAT SERPL-MCNC: 0.68 MG/DL (ref 0.52–1.04)
GFR SERPL CREATININE-BSD FRML MDRD: >90 ML/MIN/1.7M2
GLUCOSE SERPL-MCNC: 99 MG/DL (ref 70–99)
POTASSIUM SERPL-SCNC: 4.5 MMOL/L (ref 3.4–5.3)
PROT SERPL-MCNC: 7.8 G/DL (ref 6.8–8.8)
SODIUM SERPL-SCNC: 138 MMOL/L (ref 133–144)

## 2018-03-08 PROCEDURE — 36415 COLL VENOUS BLD VENIPUNCTURE: CPT | Performed by: PHYSICIAN ASSISTANT

## 2018-03-08 PROCEDURE — 99214 OFFICE O/P EST MOD 30 MIN: CPT | Performed by: PHYSICIAN ASSISTANT

## 2018-03-08 PROCEDURE — 80053 COMPREHEN METABOLIC PANEL: CPT | Performed by: PHYSICIAN ASSISTANT

## 2018-03-08 NOTE — PROGRESS NOTES
SUBJECTIVE:   Catia James is a 52 year old female who presents to clinic today for the following health issues:      Hypertension Follow-up      Outpatient blood pressures are being checked at home.  Results are 123/95, 115/85, 138/94.    Low Salt Diet: not monitoring salt      Amount of exercise or physical activity: 2-3 days/week for an average of 15-30 minutes    Problems taking medications regularly: No    Medication side effects: none    Diet: regular (no restrictions)    Patient taking medicine daily with no issues.    Does have some anxiousness that can increase her readings sometimes.    No symptoms related to higher numbers though, no chest pain, headaches, shortness of breath ect.    Patient due to repeat Complete Metabolic Panel due to history of elevated liver enzymes; never got the abdominal sonogram as previously suggested by Dr. Jennings due to this.            Problem list and histories reviewed & adjusted, as indicated.  Additional history: as documented    Patient Active Problem List   Diagnosis     Allergic rhinitis     Bump     Hypertension, goal below 140/90     BMI 33.0-33.9,adult     Past Surgical History:   Procedure Laterality Date     HC REMOVAL GALLBLADDER  1-04     SURGICAL HISTORY OF -        had Vasectomy       Social History   Substance Use Topics     Smoking status: Former Smoker     Packs/day: 0.50     Years: 25.00     Types: Cigarettes     Quit date: 7/28/2014     Smokeless tobacco: Never Used     Alcohol use Yes      Comment: 3 mixed drinks a week     Family History   Problem Relation Age of Onset     DIABETES Mother      Family History Negative Father      Family History Negative Maternal Grandmother      Family History Negative Maternal Grandfather      Family History Negative Paternal Grandmother      Family History Negative Paternal Grandfather          Current Outpatient Prescriptions   Medication Sig Dispense Refill     Acetaminophen (TYLENOL PO)        cetirizine  (ZYRTEC) 10 MG tablet Take 1 tablet (10 mg) by mouth daily as needed for allergies Prn 30 tablet 11     fluticasone (FLONASE) 50 MCG/ACT spray Spray 2 sprays into both nostrils daily 1 Bottle 11     lisinopril (PRINIVIL/ZESTRIL) 20 MG tablet Take 1 tablet (20 mg) by mouth daily 30 tablet 11     Ibuprofen (ADVIL PO) Take by mouth as needed       Allergies   Allergen Reactions     Animal Dander      Dust Mites      Erythromycin      BP Readings from Last 3 Encounters:   03/08/18 130/84   01/13/18 (!) 154/102   09/25/17 130/88    Wt Readings from Last 3 Encounters:   03/08/18 190 lb (86.2 kg)   01/13/18 181 lb (82.1 kg)   09/25/17 192 lb (87.1 kg)               Reviewed and updated as needed this visit by clinical staff  Tobacco  Allergies  Meds  Problems  Med Hx  Surg Hx  Fam Hx  Soc Hx        Reviewed and updated as needed this visit by Provider  Allergies  Meds  Problems         ROS:  Constitutional, HEENT, cardiovascular, pulmonary, GI, , musculoskeletal, neuro, skin, endocrine and psych systems are negative, except as otherwise noted.    OBJECTIVE:     /84 (BP Location: Left arm, Patient Position: Sitting, Cuff Size: Adult Large)  Pulse 60  Temp 98.1  F (36.7  C) (Oral)  Resp 17  Wt 190 lb (86.2 kg)  LMP 08/03/2015  SpO2 99%  BMI 32.61 kg/m2  Body mass index is 32.61 kg/(m^2).  GENERAL: healthy, alert and no distress  NECK: no adenopathy, no asymmetry, masses, or scars and thyroid normal to palpation  RESP: lungs clear to auscultation - no rales, rhonchi or wheezes  CV: regular rate and rhythm, normal S1 S2, no S3 or S4, no murmur, click or rub, no peripheral edema and peripheral pulses strong  PSYCH: mentation appears normal, affect normal/bright    Diagnostic Test Results:  none     ASSESSMENT/PLAN:       ICD-10-CM    1. Hypertension, goal below 140/90 I10 Long standing, chronic, stable -  Stable at this time, continue with lisinopril 20 mg daily; refills to be sent to pharmacy as  needed. See patient instructions if needed for next steps.  Comprehensive metabolic panel   2. Elevated liver enzymes R74.8 Repeat Complete Metabolic Panel today as well. Advised to get abdominal sonogram if remain elevated. Caution with increased NSAIDS (ibuprofen, advil, aleve type products) or alcohol use.       Patient Instructions   Labs updated today.  Continue with lisinopril 20 mg daily, refills sent to pharmacy.   Consider addition of hctz (water pill) if needed for elevated BP readings in the near future.    Patient to call radiology to make appointment for abdominal sonogram (as previously ordered) if liver enzymes still elevated: [phone]      Call Central Scheduling [phone] to schedule your mammogram and colonoscopy.      Return to clinic with any worsening or changes in symptoms and follow up for routine care, especially pap smear.       Petty Chun PA-C  [facility]

## 2018-03-08 NOTE — PATIENT INSTRUCTIONS
Labs updated today.  Continue with lisinopril 20 mg daily, refills sent to pharmacy.   Consider addition of hctz (water pill) if needed for elevated BP readings in the near future.    Patient to call radiology to make appointment for abdominal sonogram (as previously ordered) if liver enzymes still elevated: [phone]      Call Central Scheduling [phone] to schedule your mammogram and colonoscopy.      Return to clinic with any worsening or changes in symptoms and follow up for routine care, especially pap smear.

## 2018-03-08 NOTE — MR AVS SNAPSHOT
After Visit Summary   3/8/2018    Catia James    MRN: [id number]           Patient Information     Date Of Birth          1965        Visit Information        Provider Department      3/8/2018 10:40 AM Petty Chun PA-C [facility]        Today's Diagnoses     Hypertension, goal below 140/90    -  1    Elevated liver enzymes          Care Instructions    Labs updated today.  Continue with lisinopril 20 mg daily, refills sent to pharmacy.   Consider addition of hctz (water pill) if needed for elevated B readings in the near future.    Patient to call radiology to make appointment for abdominal sonogram (as previously ordered) if liver enzymes still elevated: [phone]      Call Central Scheduling [phone] to schedule your mammogram and colonoscopy.      Return to clinic with any worsening or changes in symptoms and follow up for routine care, especially pap smear.           Follow-ups after your visit        Follow-up notes from your care team     Return if symptoms worsen or fail to improve.      Who to contact     If you have questions or need follow up information about today's clinic visit or your schedule please contact [facility] directly at [phone].  Normal or non-critical lab and imaging results will be communicated to you by MyChart, letter or phone within 4 business days after the clinic has received the results. If you do not hear from us within 7 days, please contact the clinic through [facility] or phone. If you have a critical or abnormal lab result, we will notify you by phone as soon as possible.  Submit refill requests through [employer] or call your pharmacy and they will forward the refill request to us. Please allow 3 business days for your refill to be completed.          Additional Information About Your Visit        MyChart Information     [employer] gives you secure access to your electronic health record. If you see a primary  care provider, you can also send messages to your care team and make appointments. If you have questions, please call your primary care clinic.  If you do not have a primary care provider, please call [phone] and they will assist you.        Care EveryWhere ID     This is your Care EveryWhere ID. This could be used by other organizations to access your [facility] medical records  [id number]        Your Vitals Were     Pulse Temperature Respirations Last Period Pulse Oximetry BMI (Body Mass Index)    60 98.1  F (36.7  C) (Oral) 17 08/03/2015 99% 32.61 kg/m2       Blood Pressure from Last 3 Encounters:   03/08/18 130/84   01/13/18 (!) 154/102   09/25/17 130/88    Weight from Last 3 Encounters:   03/08/18 190 lb (86.2 kg)   01/13/18 181 lb (82.1 kg)   09/25/17 192 lb (87.1 kg)              We Performed the Following     Comprehensive metabolic panel        Primary Care Provider Office Phone # Fax #    Petty Chun PA-C [phone]       [address]  [facility] [address]        Equal Access to Services     NORM [facility] UnitBR[facility] : Hadii aad ku hadasho Soomaali, waaxda luqadaha, qaybta kaalmada adeegyada, cindy roper haymiken jaden cristobal . So [facility] [phone].    ATENCIÓN: Si habla español, tiene a lemus disposición servicios gratuitos de asistencia lingüística. [facility] [phone].    We comply with applicable federal civil rights laws and [place] laws. We do not discriminate on the basis of race, color, national origin, age, disability, sex, sexual orientation, or gender identity.            Thank you!     Thank you for choosing [facility]  for your care. Our goal is always to provide you with excellent care. Hearing back from our patients is one way we can continue to improve our services. Please take a few minutes to complete the written survey that you may receive in the mail after your visit with us. Thank you!             Your Updated Medication List -  Protect others around you: Learn how to safely use, store and throw away your medicines at www.disposemymeds.org.          This list is accurate as of 3/8/18 11:25 AM.  Always use your most recent med list.                   Brand Name Dispense Instructions for use Diagnosis    ADVIL PO      Take by mouth as needed        cetirizine 10 MG tablet    zyrTEC    30 tablet    Take 1 tablet (10 mg) by mouth daily as needed for allergies Prn    Acute seasonal allergic rhinitis due to pollen       fluticasone 50 MCG/ACT spray    FLONASE    1 Bottle    Spray 2 sprays into both nostrils daily    Acute seasonal allergic rhinitis due to pollen       lisinopril 20 MG tablet    PRINIVIL/ZESTRIL    30 tablet    Take 1 tablet (20 mg) by mouth daily    Acute seasonal allergic rhinitis due to pollen, Essential hypertension       TYLENOL PO

## 2018-03-08 NOTE — PROGRESS NOTES
"Corona Bardales  Your attached labs are back to normal!! I wouldn't worry about doing the abdominal sonogram anytime soon.  Please contact the office with any questions or concerns.    Petty Vazquez \"Kenneth\" RODNEY Chun  "

## 2018-10-01 DIAGNOSIS — I10 ESSENTIAL HYPERTENSION: ICD-10-CM

## 2018-10-01 DIAGNOSIS — J30.1 ACUTE SEASONAL ALLERGIC RHINITIS DUE TO POLLEN: ICD-10-CM

## 2018-10-01 NOTE — TELEPHONE ENCOUNTER
"Requested Prescriptions   Pending Prescriptions Disp Refills     lisinopril (PRINIVIL/ZESTRIL) 20 MG tablet  Last Written Prescription Date:  9-16-17  Last Fill Quantity: 30 tab,  # refills: 11   Last office visit: 3/8/2018 with prescribing provider:  MONICA Chun   Future Office Visit:    30 tablet 11     Sig: Take 1 tablet (20 mg) by mouth daily    ACE Inhibitors (Including Combos) Protocol Passed    10/1/2018 11:22 AM       Passed - Blood pressure under 140/90 in past 12 months    BP Readings from Last 3 Encounters:   03/08/18 130/84   01/13/18 (!) 154/102   09/25/17 130/88          Passed - Recent (12 mo) or future (30 days) visit within the authorizing provider's specialty    Patient had office visit in the last 12 months or has a visit in the next 30 days with authorizing provider or within the authorizing provider's specialty.  See \"Patient Info\" tab in inbasket, or \"Choose Columns\" in Meds & Orders section of the refill encounter.           Passed - Patient is age 18 or older       Passed - No active pregnancy on record       Passed - Normal serum creatinine on file in past 12 months    Recent Labs   Lab Test  03/08/18   1056   CR  0.68          Passed - Normal serum potassium on file in past 12 months    Recent Labs   Lab Test  03/08/18   1056   POTASSIUM  4.5          Passed - No positive pregnancy test in past 12 months          "

## 2018-10-02 RX ORDER — LISINOPRIL 20 MG/1
20 TABLET ORAL DAILY
Qty: 30 TABLET | Refills: 5 | Status: SHIPPED | OUTPATIENT
Start: 2018-10-02 | End: 2019-04-22

## 2018-10-10 DIAGNOSIS — J30.1 ACUTE SEASONAL ALLERGIC RHINITIS DUE TO POLLEN: ICD-10-CM

## 2018-10-10 NOTE — TELEPHONE ENCOUNTER
"Requested Prescriptions   Pending Prescriptions Disp Refills     cetirizine (ZYRTEC) 10 MG tablet  Last Written Prescription Date:  9-16-17  Last Fill Quantity: 30 tab,  # refills: 11   Last office visit: 3/8/2018 with prescribing provider:   MONICA Chun  Future Office Visit:    30 tablet 11     Sig: Take 1 tablet (10 mg) by mouth daily as needed for allergies Prn    Antihistamines Protocol Passed    10/10/2018 11:17 AM       Passed - Patient is 3-64 years of age    Apply weight-based dosing for peds patients age 3 - 12 years of age.    Forward request to provider for patients under the age of 3 or over the age of 64.         Passed - Recent (12 mo) or future (30 days) visit within the authorizing provider's specialty    Patient had office visit in the last 12 months or has a visit in the next 30 days with authorizing provider or within the authorizing provider's specialty.  See \"Patient Info\" tab in inbasket, or \"Choose Columns\" in Meds & Orders section of the refill encounter.         ____________________________________         fluticasone (FLONASE) 50 MCG/ACT spray  Last Written Prescription Date:  9-16-18  Last Fill Quantity: 1 btl,  # refills: 11   Last office visit: 3/8/2018 with prescribing provider:  MONICA Chun   Future Office Visit:     1 Bottle 11     Sig: Spray 2 sprays into both nostrils daily    Inhaled Steroids Protocol Passed    10/10/2018 11:17 AM       Passed - Patient is age 12 or older       Passed - Recent (12 mo) or future (30 days) visit within the authorizing provider's specialty    Patient had office visit in the last 12 months or has a visit in the next 30 days with authorizing provider or within the authorizing provider's specialty.  See \"Patient Info\" tab in inbasket, or \"Choose Columns\" in Meds & Orders section of the refill encounter.              "

## 2018-10-15 RX ORDER — FLUTICASONE PROPIONATE 50 MCG
2 SPRAY, SUSPENSION (ML) NASAL DAILY
Qty: 3 BOTTLE | Refills: 1 | Status: SHIPPED | OUTPATIENT
Start: 2018-10-15 | End: 2020-02-12

## 2018-10-15 RX ORDER — CETIRIZINE HYDROCHLORIDE 10 MG/1
10 TABLET ORAL DAILY PRN
Qty: 90 TABLET | Refills: 1 | Status: SHIPPED | OUTPATIENT
Start: 2018-10-15 | End: 2020-02-12

## 2018-10-15 NOTE — TELEPHONE ENCOUNTER
Prescription approved per [facility] [place].    Signed Prescriptions:                        Disp   Refills    cetirizine (ZYRTEC) 10 MG tablet           90 tab*1        Sig: Take 1 tablet (10 mg) by mouth daily as needed for           allergies Prn  Authorizing Provider: GARETT BEAN  Ordering User: MARGARET THIBODEAUX    fluticasone (FLONASE) 50 MCG/ACT spray     3 [place]*1        Sig: Spray 2 sprays into both nostrils daily  Authorizing Provider: GARETT BEAN  Ordering User: MARGARET THIBODEAUX      Closing encounter - no further actions needed at this time    Margaret Thibodeaux RN

## 2018-11-10 ENCOUNTER — HEALTH MAINTENANCE LETTER (OUTPATIENT)
Age: 53
End: 2018-11-10

## 2019-04-22 DIAGNOSIS — J30.1 ACUTE SEASONAL ALLERGIC RHINITIS DUE TO POLLEN: ICD-10-CM

## 2019-04-22 DIAGNOSIS — I10 ESSENTIAL HYPERTENSION: ICD-10-CM

## 2019-04-22 NOTE — TELEPHONE ENCOUNTER
"Requested Prescriptions   Pending Prescriptions Disp Refills     lisinopril (PRINIVIL/ZESTRIL) 20 MG tablet [Pharmacy Med Name: LISINOPRIL 20MG TABLETS] 30 tablet 0     Sig: TAKE 1 TABLET(20 MG) BY MOUTH DAILY  Last Written Prescription Date:  10/2/2018  Last Fill Quantity: 30 tablet,  # refills: 5   Last Office Visit: 3/8/2018   Future Office Visit:            ACE Inhibitors (Including Combos) Protocol Failed - 4/22/2019  9:15 AM        Failed - Blood pressure under 140/90 in past 12 months     BP Readings from Last 3 Encounters:   03/08/18 130/84   01/13/18 (!) 154/102   09/25/17 130/88           Failed - Recent (12 mo) or future (30 days) visit within the authorizing provider's specialty     Patient had office visit in the last 12 months or has a visit in the next 30 days with authorizing provider or within the authorizing provider's specialty.  See \"Patient Info\" tab in inbasket, or \"Choose Columns\" in Meds & Orders section of the refill encounter.            Failed - Normal serum creatinine on file in past 12 months     Recent Labs   Lab Test 03/08/18  1056   CR 0.68           Failed - Normal serum potassium on file in past 12 months     Recent Labs   Lab Test 03/08/18  1056   POTASSIUM 4.5           Passed - Medication is active on med list        Passed - Patient is age 18 or older        Passed - No active pregnancy on record        Passed - No positive pregnancy test within past 12 months          "

## 2019-04-22 NOTE — LETTER
April 24, 2019      Catia James  [address]  [facility] [address]        Dear Catia,     In order to ensure we are providing the best quality care, we have reviewed your chart and see that you are due for:  1.   Annual physical     Please call the clinic at your earliest convenience to schedule an appointment.    We greatly appreciate the opportunity to serve you.  Thank you for trusting us with your health care.    Your care team at [facility]        Sincerely,        Petty Chun PA-C

## 2019-04-23 RX ORDER — LISINOPRIL 20 MG/1
TABLET ORAL
Qty: 30 TABLET | Refills: 0 | Status: SHIPPED | OUTPATIENT
Start: 2019-04-23 | End: 2019-05-21

## 2019-04-23 NOTE — TELEPHONE ENCOUNTER
One month refill only as patient overdue for annual physical/BP follow up visit and monitoring labs.    Labs can be addressed at office visit.    Team coordinators-Please contact patient to schedule.    Thank you!  MARAL MurrayN, RN

## 2019-05-21 DIAGNOSIS — I10 ESSENTIAL HYPERTENSION: ICD-10-CM

## 2019-05-21 DIAGNOSIS — J30.1 ACUTE SEASONAL ALLERGIC RHINITIS DUE TO POLLEN: ICD-10-CM

## 2019-05-21 NOTE — TELEPHONE ENCOUNTER
"Requested Prescriptions   Pending Prescriptions Disp Refills     lisinopril (PRINIVIL/ZESTRIL) 20 MG tablet [Pharmacy Med Name: LISINOPRIL 20MG TABLETS] 30 tablet 0     Sig: TAKE 1 TABLET BY MOUTH EVERY DAY.  Last Written Prescription Date:  4/23/2019  Last Fill Quantity: 30 tablet,  # refills: 0   Last Office Visit: 3/8/2018   Future Office Visit:            ACE Inhibitors (Including Combos) Protocol Failed - 5/21/2019  1:11 PM        Failed - Blood pressure under 140/90 in past 12 months     BP Readings from Last 3 Encounters:   03/08/18 130/84   01/13/18 (!) 154/102   09/25/17 130/88           Failed - Recent (12 mo) or future (30 days) visit within the authorizing provider's specialty     Patient had office visit in the last 12 months or has a visit in the next 30 days with authorizing provider or within the authorizing provider's specialty.  See \"Patient Info\" tab in inbasket, or \"Choose Columns\" in Meds & Orders section of the refill encounter.            Failed - Normal serum creatinine on file in past 12 months     Recent Labs   Lab Test 03/08/18  1056   CR 0.68           Failed - Normal serum potassium on file in past 12 months     Recent Labs   Lab Test 03/08/18  1056   POTASSIUM 4.5           Passed - Medication is active on med list        Passed - Patient is age 18 or older        Passed - No active pregnancy on record        Passed - No positive pregnancy test within past 12 months          "

## 2019-05-23 RX ORDER — LISINOPRIL 20 MG/1
TABLET ORAL
Qty: 15 TABLET | Refills: 0 | Status: SHIPPED | OUTPATIENT
Start: 2019-05-23 | End: 2019-10-02

## 2019-05-23 NOTE — TELEPHONE ENCOUNTER
Routing refill request to provider for review/approval because:  Sophie given x1 and patient did not follow up, please advise    HW Reception - ask patient to schedule and bridge can be discussed with provider please

## 2019-10-02 ENCOUNTER — OFFICE VISIT (OUTPATIENT)
Dept: FAMILY MEDICINE | Facility: CLINIC | Age: 54
End: 2019-10-02
Payer: COMMERCIAL

## 2019-10-02 VITALS
SYSTOLIC BLOOD PRESSURE: 156 MMHG | OXYGEN SATURATION: 98 % | WEIGHT: 197 LBS | HEART RATE: 68 BPM | HEIGHT: 64 IN | DIASTOLIC BLOOD PRESSURE: 100 MMHG | BODY MASS INDEX: 33.63 KG/M2 | TEMPERATURE: 97.8 F

## 2019-10-02 DIAGNOSIS — Z13.220 LIPID SCREENING: ICD-10-CM

## 2019-10-02 DIAGNOSIS — Z12.11 COLON CANCER SCREENING: ICD-10-CM

## 2019-10-02 DIAGNOSIS — I10 ESSENTIAL HYPERTENSION: ICD-10-CM

## 2019-10-02 DIAGNOSIS — Z23 NEED FOR PROPHYLACTIC VACCINATION AND INOCULATION AGAINST INFLUENZA: ICD-10-CM

## 2019-10-02 LAB
ANION GAP SERPL CALCULATED.3IONS-SCNC: 6 MMOL/L (ref 3–14)
BUN SERPL-MCNC: 12 MG/DL (ref 7–30)
CALCIUM SERPL-MCNC: 9.3 MG/DL (ref 8.5–10.1)
CHLORIDE SERPL-SCNC: 107 MMOL/L (ref 94–109)
CHOLEST SERPL-MCNC: 177 MG/DL
CO2 SERPL-SCNC: 26 MMOL/L (ref 20–32)
CREAT SERPL-MCNC: 0.51 MG/DL (ref 0.52–1.04)
GFR SERPL CREATININE-BSD FRML MDRD: >90 ML/MIN/{1.73_M2}
GLUCOSE SERPL-MCNC: 118 MG/DL (ref 70–99)
HDLC SERPL-MCNC: 57 MG/DL
LDLC SERPL CALC-MCNC: 100 MG/DL
NONHDLC SERPL-MCNC: 120 MG/DL
POTASSIUM SERPL-SCNC: 4.2 MMOL/L (ref 3.4–5.3)
SODIUM SERPL-SCNC: 139 MMOL/L (ref 133–144)
TRIGL SERPL-MCNC: 98 MG/DL

## 2019-10-02 PROCEDURE — 99214 OFFICE O/P EST MOD 30 MIN: CPT | Mod: 25 | Performed by: FAMILY MEDICINE

## 2019-10-02 PROCEDURE — 90471 IMMUNIZATION ADMIN: CPT | Performed by: FAMILY MEDICINE

## 2019-10-02 PROCEDURE — 36415 COLL VENOUS BLD VENIPUNCTURE: CPT | Performed by: FAMILY MEDICINE

## 2019-10-02 PROCEDURE — 82043 UR ALBUMIN QUANTITATIVE: CPT | Performed by: FAMILY MEDICINE

## 2019-10-02 PROCEDURE — 90682 RIV4 VACC RECOMBINANT DNA IM: CPT | Performed by: FAMILY MEDICINE

## 2019-10-02 PROCEDURE — 80048 BASIC METABOLIC PNL TOTAL CA: CPT | Performed by: FAMILY MEDICINE

## 2019-10-02 PROCEDURE — 80061 LIPID PANEL: CPT | Performed by: FAMILY MEDICINE

## 2019-10-02 RX ORDER — LISINOPRIL 20 MG/1
20 TABLET ORAL DAILY
Qty: 90 TABLET | Refills: 0 | Status: SHIPPED | OUTPATIENT
Start: 2019-10-02 | End: 2020-02-12

## 2019-10-02 ASSESSMENT — MIFFLIN-ST. JEOR: SCORE: 1465.65

## 2019-10-02 NOTE — PROGRESS NOTES
"Subjective     Catia James is a 54 year old female who presents to clinic today for the following health issues:    HPI   Hypertension Follow-up      Do you check your blood pressure regularly outside of the clinic? No     Are you following a low salt diet? No    Are your blood pressures ever more than 140 on the top number (systolic) OR more   than 90 on the bottom number (diastolic), for example 140/90? Yes      How many servings of fruits and vegetables do you eat daily?  0-1    On average, how many sweetened beverages do you drink each day (soda, juice, sweet tea, etc)?   0  How many days per week do you miss taking your medication? 1    What makes it hard for you to take your medications?  remembering to take    She has been out of lisinopril 20 mg daily for one week.   No chest pain, shortness of breath, headaches or claudication.  She checks occasional home blood pressures.   Recent stressors -   Pts daughter had twins. Her grandson had heart surgery last week.  Her son is getting  end of the month.     Reviewed and updated as needed this visit by Provider         Review of Systems   ROS COMP: Constitutional, HEENT, cardiovascular, pulmonary, gi and gu systems are negative, except as otherwise noted.      Objective    LMP 08/03/2015   There is no height or weight on file to calculate BMI.  Physical Exam   BP (!) 156/100   Pulse 68   Temp 97.8  F (36.6  C) (Oral)   Ht 1.613 m (5' 3.5\")   Wt 89.4 kg (197 lb)   LMP 08/03/2015   SpO2 98%   BMI 34.35 kg/m    GENERAL: healthy, alert and no distress  EYES: Eyes grossly normal to inspection  HENT:  nose and mouth without ulcers or lesions  PSYCH: mentation appears normal, tearful at times when talking about grandson     Diagnostic Test Results:  none         Assessment & Plan     1. Essential hypertension  - Pt has had recent family stressors. B/P elevated in clinic. She has been out of medication for around one week.   - advised below  Please check " your blood pressure at home daily or every other day and call me in two weeks with your readings. I will adjust your medication as needed.   Please follow up in six to eight weeks.   - lisinopril (PRINIVIL/ZESTRIL) 20 MG tablet; Take 1 tablet (20 mg) by mouth daily  Dispense: 90 tablet; Refill: 0  - Basic metabolic panel  - Albumin Random Urine Quantitative with Creat Ratio    2. Lipid screening  - Lipid Profile (Chol, Trig, HDL, LDL calc)  The 10-year ASCVD risk score (Potter DC Jr., et al., 2013) is: 3.4%    Values used to calculate the score:      Age: 54 years      Sex: Female      Is Non- : No      Diabetic: No      Tobacco smoker: No      Systolic Blood Pressure: 156 mmHg      Is BP treated: Yes      HDL Cholesterol: 57 mg/dL      Total Cholesterol: 177 mg/dL    3. Colon cancer screening  - GASTROENTEROLOGY ADULT REF PROCEDURE ONLY    4. Need for prophylactic vaccination and inoculation against influenza  - INFLUENZA QUAD, RECOMBINANT, P-FREE (RIV4) (FLUBLOCK) [71652]  - Vaccine Administration, Initial [43635]          Return in about 6 weeks (around 11/13/2019) for Routine Visit.    Aguilar Akers MD  [facility]

## 2019-10-02 NOTE — PATIENT INSTRUCTIONS
Please check your blood pressure at home daily or every other day and call me in two weeks with your readings. I will adjust your medication as needed.   Please follow up in six to eight weeks.

## 2019-10-03 LAB
CREAT UR-MCNC: 22 MG/DL
MICROALBUMIN UR-MCNC: 10 MG/L
MICROALBUMIN/CREAT UR: 46.98 MG/G CR (ref 0–25)

## 2019-10-29 ENCOUNTER — HEALTH MAINTENANCE LETTER (OUTPATIENT)
Age: 54
End: 2019-10-29

## 2019-12-09 ENCOUNTER — TELEPHONE (OUTPATIENT)
Dept: FAMILY MEDICINE | Facility: CLINIC | Age: 54
End: 2019-12-09

## 2019-12-09 NOTE — TELEPHONE ENCOUNTER
Panel Management Review      Patient has the following on her problem list:     Hypertension   Last three blood pressure readings:  BP Readings from Last 3 Encounters:   10/02/19 (!) 156/100   03/08/18 130/84   01/13/18 (!) 154/102     Blood pressure: FAILED    HTN Guidelines:  Less than 140/90      Composite cancer screening  Chart review shows that this patient is due/due soon for the following None  Summary:    Patient is due/failing the following:   BP CHECK    Action needed:   Patient needs office visit for bp check with pcp.    Type of outreach:    Phone, left message for patient to call back.     Questions for provider review:    None                                                                                                                                    Heather Blanton CMA       Chart routed to Care Team .

## 2019-12-15 ENCOUNTER — HEALTH MAINTENANCE LETTER (OUTPATIENT)
Age: 54
End: 2019-12-15

## 2020-01-13 ENCOUNTER — TELEPHONE (OUTPATIENT)
Dept: FAMILY MEDICINE | Facility: CLINIC | Age: 55
End: 2020-01-13

## 2020-01-13 NOTE — TELEPHONE ENCOUNTER
.  Panel Management Review      Patient has the following on her problem list: None      Composite cancer screening  Chart review shows that this patient is due/due soon for the following Pap Smear and Mammogram  Summary:    Patient is due/failing the following:   BP CHECK and PHYSICAL    Action needed:   Patient needs office visit for    Health Maintenance Due   Topic Date Due     ADVANCE CARE PLANNING  1965     COLONOSCOPY  07/28/1974     HIV SCREENING  07/28/1979     ZOSTER IMMUNIZATION (1 of 2) 07/28/2014     PREVENTIVE CARE VISIT  09/08/2016     PAP  09/08/2018     MAMMO SCREENING  09/27/2019     PHQ-2  01/01/2020     .    Type of outreach:    Phone, left message for patient to call back.     Questions for provider review:    None                                                                                                                                    Benton Pollock MA       Chart routed to Care Team .

## 2020-01-14 NOTE — TELEPHONE ENCOUNTER
Pt called back and scheduled BP check. Pt wants to call back at a later time to schedule her physical.       Peg CLANCY     [facility]

## 2020-01-15 ENCOUNTER — ALLIED HEALTH/NURSE VISIT (OUTPATIENT)
Dept: NURSING | Facility: CLINIC | Age: 55
End: 2020-01-15
Payer: COMMERCIAL

## 2020-01-15 VITALS — HEART RATE: 63 BPM | OXYGEN SATURATION: 97 % | SYSTOLIC BLOOD PRESSURE: 150 MMHG | DIASTOLIC BLOOD PRESSURE: 98 MMHG

## 2020-01-15 DIAGNOSIS — Z01.30 BP CHECK: Primary | ICD-10-CM

## 2020-01-15 PROCEDURE — 99207 ZZC NO CHARGE NURSE ONLY: CPT

## 2020-01-15 NOTE — PROGRESS NOTES
Catia James is a 54 year old patient who comes in today for a Blood Pressure check.  Initial BP:  BP (!) 150/98 (BP Location: Right arm, Patient Position: Sitting, Cuff Size: Adult Large)   Pulse 63   LMP 08/03/2015   SpO2 97%      63  Disposition: BP elevated.  Triage RN notified, patient asked to wait    She was scheduled for an office visit with  on Friday the 17th at 7:40am.  Pt did not take her bp medication today and was advised to do so.     Twyla Hunter CMA on 1/15/2020 at 8:33 AM

## 2020-01-17 ENCOUNTER — OFFICE VISIT (OUTPATIENT)
Dept: FAMILY MEDICINE | Facility: CLINIC | Age: 55
End: 2020-01-17
Payer: COMMERCIAL

## 2020-01-17 VITALS
DIASTOLIC BLOOD PRESSURE: 110 MMHG | BODY MASS INDEX: 33.29 KG/M2 | WEIGHT: 195 LBS | RESPIRATION RATE: 14 BRPM | HEART RATE: 66 BPM | HEIGHT: 64 IN | OXYGEN SATURATION: 99 % | SYSTOLIC BLOOD PRESSURE: 140 MMHG | TEMPERATURE: 98.1 F

## 2020-01-17 DIAGNOSIS — Z12.11 COLON CANCER SCREENING: ICD-10-CM

## 2020-01-17 DIAGNOSIS — Z12.31 ENCOUNTER FOR SCREENING MAMMOGRAM FOR BREAST CANCER: ICD-10-CM

## 2020-01-17 DIAGNOSIS — I10 ESSENTIAL HYPERTENSION: ICD-10-CM

## 2020-01-17 PROCEDURE — 99214 OFFICE O/P EST MOD 30 MIN: CPT | Performed by: FAMILY MEDICINE

## 2020-01-17 RX ORDER — LISINOPRIL 40 MG/1
40 TABLET ORAL DAILY
Qty: 90 TABLET | Refills: 0 | Status: SHIPPED | OUTPATIENT
Start: 2020-01-17 | End: 2020-02-12

## 2020-01-17 ASSESSMENT — MIFFLIN-ST. JEOR: SCORE: 1456.57

## 2020-01-17 NOTE — PROGRESS NOTES
"Subjective     Catia James is a 54 year old female who presents to clinic today for the following health issues:    HPI   Hypertension Follow-up      Do you check your blood pressure regularly outside of the clinic? Yes     Are you following a low salt diet? No    Are your blood pressures ever more than 140 on the top number (systolic) OR more   than 90 on the bottom number (diastolic), for example 140/90? Yes      How many servings of fruits and vegetables do you eat daily?  2-3    On average, how many sweetened beverages do you drink each day (Examples: soda, juice, sweet tea, etc.  Do NOT count diet or artificially sweetened beverages)?   0    How many days per week do you exercise enough to make your heart beat faster? 3 or less    How many days per week do you miss taking your medication? 0      Doing well with lisinopril. No s/e.   Home B/P fluctuates.       Reviewed and updated as needed this visit by Provider         Review of Systems   ROS COMP: Constitutional, HEENT, cardiovascular, pulmonary, gi and gu systems are negative, except as otherwise noted.      Objective    LMP 08/03/2015   There is no height or weight on file to calculate BMI.  Physical Exam   BP (!) 146/100   Pulse 66   Temp 98.1  F (36.7  C) (Oral)   Resp 14   Ht 1.613 m (5' 3.5\")   Wt 88.5 kg (195 lb)   LMP 08/03/2015   SpO2 99%   BMI 34.00 kg/m     BP (!) 140/110   Pulse 66   Temp 98.1  F (36.7  C) (Oral)   Resp 14   Ht 1.613 m (5' 3.5\")   Wt 88.5 kg (195 lb)   LMP 08/03/2015   SpO2 99%   BMI 34.00 kg/m    GENERAL: healthy, alert and no distress  EYES: Eyes grossly normal to inspection  HENT:  nose and mouth without ulcers or lesions  PSYCH: mentation appears normal, affect normal    Diagnostic Test Results:  none         Assessment & Plan     1. Essential hypertension  - B/P elevated   - advised low salt diet, activity as tolerated  - increase Lisinopril from 20 mg daily to 40 mg daily  - lisinopril (PRINIVIL/ZESTRIL) 40 " MG tablet; Take 1 tablet (40 mg) by mouth daily  Dispense: 90 tablet; Refill: 0    2. Encounter for screening mammogram for breast cancer  - MA SCREENING DIGITAL BILAT - Future  (s+30); Future    3. Colon cancer screening  - GASTROENTEROLOGY ADULT REF PROCEDURE ONLY    Physical scheduled for 2/12/2020. Needs repeat BMP during visit.     Aguilar Akers MD  [facility]

## 2020-01-17 NOTE — PATIENT INSTRUCTIONS
Hypertension (high blood pressure) -    Increase Lisinopril to 40 mg daily. Recheck blood pressure and kidney functions during your physical.   Low salt diet and increased activity as tolerated.       Mammogram - [phone]    [facility] [phone]  [facility] [phone]

## 2020-02-11 ASSESSMENT — ENCOUNTER SYMPTOMS
ARTHRALGIAS: 0
NAUSEA: 0
CONSTIPATION: 0
SORE THROAT: 0
ABDOMINAL PAIN: 0
HEMATURIA: 0
COUGH: 0
PALPITATIONS: 0
HEMATOCHEZIA: 0
HEADACHES: 0
DYSURIA: 0
NERVOUS/ANXIOUS: 0
SHORTNESS OF BREATH: 0
BREAST MASS: 0
CHILLS: 0
DIZZINESS: 0
EYE PAIN: 0
FEVER: 0
WEAKNESS: 0
JOINT SWELLING: 0
DIARRHEA: 0
PARESTHESIAS: 0
HEARTBURN: 0
FREQUENCY: 0

## 2020-02-12 ENCOUNTER — DOCUMENTATION ONLY (OUTPATIENT)
Dept: FAMILY MEDICINE | Facility: CLINIC | Age: 55
End: 2020-02-12

## 2020-02-12 ENCOUNTER — OFFICE VISIT (OUTPATIENT)
Dept: FAMILY MEDICINE | Facility: CLINIC | Age: 55
End: 2020-02-12
Payer: COMMERCIAL

## 2020-02-12 VITALS
HEIGHT: 63 IN | DIASTOLIC BLOOD PRESSURE: 88 MMHG | BODY MASS INDEX: 34.91 KG/M2 | RESPIRATION RATE: 18 BRPM | HEART RATE: 94 BPM | OXYGEN SATURATION: 98 % | WEIGHT: 197 LBS | TEMPERATURE: 97.7 F | SYSTOLIC BLOOD PRESSURE: 136 MMHG

## 2020-02-12 DIAGNOSIS — Z01.411 ENCOUNTER FOR GYNECOLOGICAL EXAMINATION WITH ABNORMAL FINDING: ICD-10-CM

## 2020-02-12 DIAGNOSIS — Z53.20 HIV SCREENING DECLINED: ICD-10-CM

## 2020-02-12 DIAGNOSIS — Z53.20 LUNG CANCER SCREENING DECLINED BY PATIENT: ICD-10-CM

## 2020-02-12 DIAGNOSIS — Z00.00 ROUTINE GENERAL MEDICAL EXAMINATION AT A HEALTH CARE FACILITY: ICD-10-CM

## 2020-02-12 DIAGNOSIS — R73.01 ELEVATED FASTING BLOOD SUGAR: Primary | ICD-10-CM

## 2020-02-12 DIAGNOSIS — N95.2 VAGINAL ATROPHY: ICD-10-CM

## 2020-02-12 DIAGNOSIS — I10 ESSENTIAL HYPERTENSION: ICD-10-CM

## 2020-02-12 LAB
ANION GAP SERPL CALCULATED.3IONS-SCNC: 5 MMOL/L (ref 3–14)
BUN SERPL-MCNC: 13 MG/DL (ref 7–30)
CALCIUM SERPL-MCNC: 8.9 MG/DL (ref 8.5–10.1)
CHLORIDE SERPL-SCNC: 109 MMOL/L (ref 94–109)
CO2 SERPL-SCNC: 25 MMOL/L (ref 20–32)
CREAT SERPL-MCNC: 0.57 MG/DL (ref 0.52–1.04)
GFR SERPL CREATININE-BSD FRML MDRD: >90 ML/MIN/{1.73_M2}
GLUCOSE SERPL-MCNC: 143 MG/DL (ref 70–99)
POTASSIUM SERPL-SCNC: 4.2 MMOL/L (ref 3.4–5.3)
SODIUM SERPL-SCNC: 139 MMOL/L (ref 133–144)

## 2020-02-12 PROCEDURE — G0145 SCR C/V CYTO,THINLAYER,RESCR: HCPCS | Performed by: FAMILY MEDICINE

## 2020-02-12 PROCEDURE — 99396 PREV VISIT EST AGE 40-64: CPT | Performed by: FAMILY MEDICINE

## 2020-02-12 PROCEDURE — 99213 OFFICE O/P EST LOW 20 MIN: CPT | Mod: 25 | Performed by: FAMILY MEDICINE

## 2020-02-12 PROCEDURE — 80048 BASIC METABOLIC PNL TOTAL CA: CPT | Performed by: FAMILY MEDICINE

## 2020-02-12 PROCEDURE — 36415 COLL VENOUS BLD VENIPUNCTURE: CPT | Performed by: FAMILY MEDICINE

## 2020-02-12 PROCEDURE — 87624 HPV HI-RISK TYP POOLED RSLT: CPT | Performed by: FAMILY MEDICINE

## 2020-02-12 RX ORDER — LISINOPRIL 40 MG/1
40 TABLET ORAL DAILY
Qty: 90 TABLET | Refills: 1 | Status: SHIPPED | OUTPATIENT
Start: 2020-02-12 | End: 2020-10-14

## 2020-02-12 ASSESSMENT — ENCOUNTER SYMPTOMS
HEMATOCHEZIA: 0
ARTHRALGIAS: 0
FEVER: 0
BREAST MASS: 0
HEMATURIA: 0
PALPITATIONS: 0
CHILLS: 0
CONSTIPATION: 0
JOINT SWELLING: 0
WEAKNESS: 0
DYSURIA: 0
SORE THROAT: 0
DIZZINESS: 0
DIARRHEA: 0
COUGH: 0
FREQUENCY: 0
SHORTNESS OF BREATH: 0
EYE PAIN: 0
NERVOUS/ANXIOUS: 0
HEARTBURN: 0
HEADACHES: 0
ABDOMINAL PAIN: 0
PARESTHESIAS: 0
NAUSEA: 0

## 2020-02-12 ASSESSMENT — MIFFLIN-ST. JEOR: SCORE: 1461.68

## 2020-02-12 NOTE — PROGRESS NOTES
RN, can you please have pt schedule lab only appt for further diabetic testing. Her fasting blood sugar.  DM

## 2020-02-12 NOTE — PROGRESS NOTES
SUBJECTIVE:   CC: Catia James is an 54 year old woman who presents for preventive health visit.     Healthy Habits:     Getting at least 3 servings of Calcium per day:  NO    Bi-annual eye exam:  Yes    Dental care twice a year:  NO    Sleep apnea or symptoms of sleep apnea:  None    Diet:  Regular (no restrictions)    Frequency of exercise:  2-3 days/week    Duration of exercise:  15-30 minutes    Taking medications regularly:  Yes    Barriers to taking medications:  None    Medication side effects:  Not applicable    PHQ-2 Total Score: 0    Additional concerns today:  No    Home blood pressure - 139/99, 131/102, 134/103, 144/105. She is currently on Lisinopril 40 mg at bedtime.   Smoking on Saturday night around 6 cigarettes. She has been smoking greater than 30 years.     Today's PHQ-2 Score:   PHQ-2 (  [employer]) 2020   Q1: Little interest or pleasure in doing things 0   Q2: Feeling down, depressed or hopeless 0   PHQ-2 Score 0   Q1: Little interest or pleasure in doing things Not at all   Q2: Feeling down, depressed or hopeless Not at all   PHQ-2 Score 0       Abuse: Current or Past(Physical, Sexual or Emotional)- No  Do you feel safe in your environment? Yes    Have you ever done Advance Care Planning? (For example, a Health Directive, POLST, or a discussion with a medical provider or your loved ones about your wishes): No, advance care planning information given to patient to review.  Advanced care planning was discussed at today's visit.    Social History     Tobacco Use     Smoking status: Former Smoker     Packs/day: 0.50     Years: 25.00     Pack years: 12.50     Types: Cigarettes     Last attempt to quit: 2014     Years since quittin.5     Smokeless tobacco: Never Used   Substance Use Topics     Alcohol use: Yes     Comment: 3 mixed drinks a week     If you drink alcohol do you typically have >3 drinks per day or >7 drinks per week? No    Alcohol Use 2020   Prescreen: >3  drinks/day or >7 drinks/week? No   Prescreen: >3 drinks/day or >7 drinks/week? -   No flowsheet data found.    Reviewed orders with patient.  Reviewed health maintenance and updated orders accordingly - Yes  Labs reviewed in [facility]    Mammogram Screening: Patient over age 50, mutual decision to screen reflected in health maintenance. Mammogram scheduled for next week.     Pertinent mammograms are reviewed under the imaging tab.  History of abnormal Pap smear: NO - age 30-65 PAP every 5 years with negative HPV co-testing recommended  PAP / HPV Latest Ref Rng & Units 9/8/2015 1/23/2012 11/4/2008   PAP - NIL NIL NIL   HPV 16 DNA NEG Negative - -   HPV 18 DNA NEG Negative - -   OTHER HR HPV NEG Negative - -     Reviewed and updated as needed this visit by clinical staff         Reviewed and updated as needed this visit by Provider          Review of Systems   Constitutional: Negative for chills and fever.   HENT: Negative for congestion, ear pain, hearing loss and sore throat.    Eyes: Negative for pain and visual disturbance.   Respiratory: Negative for cough and shortness of breath.    Cardiovascular: Negative for chest pain, palpitations and peripheral edema.   Gastrointestinal: Negative for abdominal pain, constipation, diarrhea, heartburn, hematochezia and nausea.   Breasts:  Negative for tenderness, breast mass and discharge.   Genitourinary: Negative for dysuria, frequency, genital sores, hematuria, pelvic pain, urgency, vaginal bleeding and vaginal discharge.   Musculoskeletal: Negative for arthralgias and joint swelling.   Skin: Negative for rash.   Neurological: Negative for dizziness, weakness, headaches and paresthesias.   Psychiatric/Behavioral: Negative for mood changes. The patient is not nervous/anxious.           OBJECTIVE:   Physical Exam   BP (!) 148/96 (BP Location: Right arm, Patient Position: Sitting, Cuff Size: Adult Large)   Pulse 94   Temp 97.7  F (36.5  C) (Tympanic)   Resp 18   Ht 1.607 m  "(5' 3.25\")   Wt 89.4 kg (197 lb)   LMP 08/03/2015   SpO2 98%   Breastfeeding No   BMI 34.62 kg/m     /88   Pulse 94   Temp 97.7  F (36.5  C) (Tympanic)   Resp 18   Ht 1.607 m (5' 3.25\")   Wt 89.4 kg (197 lb)   LMP 08/03/2015   SpO2 98%   Breastfeeding No   BMI 34.62 kg/m    GENERAL: healthy, alert and no distress  EYES: Eyes grossly normal to inspection, conjunctivae and sclerae normal  HENT: ear canals and TM's normal, nose and mouth without ulcers or lesions  NECK: no adenopathy, no asymmetry, masses, or scars and thyroid normal to palpation  RESP: lungs clear to auscultation - no rales, rhonchi or wheezes  CV: regular rate and rhythm, normal S1 S2  : vaginal atrophy and mild white plaques, normal cervix   ABDOMEN: soft, nontender, no hepatosplenomegaly, no masses and bowel sounds normal  MS: no gross musculoskeletal defects noted, no edema  SKIN: no suspicious lesions or rashes  NEURO: Normal strength and tone, mentation intact and speech normal  PSYCH: mentation appears normal, affect normal/bright    Diagnostic Test Results:  Labs reviewed in Epic    ASSESSMENT/PLAN:     1. Routine general medical examination at a health care facility    2. Essential hypertension  - controlled   - Basic metabolic panel  - lisinopril (PRINIVIL/ZESTRIL) 40 MG tablet; Take 1 tablet (40 mg) by mouth daily  Dispense: 90 tablet; Refill: 1    3. Lung cancer screening declined by patient    4. HIV screening declined    5. Vaginal atrophy  - recommended following up with gynecologist     6. Encounter for gynecological examination with abnormal finding  - Pap imaged thin layer screen with HPV - recommended age 30 - 65 years (select HPV order below)  - HPV High Risk Types DNA Cervical        COUNSELING:  Reviewed preventive health counseling, as reflected in patient instructions    Estimated body mass index is 34 kg/m  as calculated from the following:    Height as of 1/17/20: 1.613 m (5' 3.5\").    Weight as of " 1/17/20: 88.5 kg (195 lb).    Weight management plan: Discussed healthy diet and exercise guidelines     reports that she quit smoking about 5 years ago. Her smoking use included cigarettes. She has a 12.50 pack-year smoking history. She has never used smokeless tobacco.  Tobacco Cessation Action Plan: intermittent use    Counseling Resources:  ATP IV Guidelines  Pooled Cohorts Equation Calculator  Breast Cancer Risk Calculator  FRAX Risk Assessment  ICSI Preventive Guidelines  Dietary Guidelines for Americans, 2010  USDA's MyPlate  ASA Prophylaxis  Lung CA Screening    Aguilar Akers MD  [facility]

## 2020-02-13 NOTE — PROGRESS NOTES
Writer spoke with patient and relayed message from Aguilar Akers MD. Patient in agreement to make lab only appt. Patient will call back to schedule.    Thanks! Laxmi Espino RN

## 2020-02-14 ENCOUNTER — HOSPITAL ENCOUNTER (OUTPATIENT)
Dept: MAMMOGRAPHY | Facility: CLINIC | Age: 55
Discharge: HOME | End: 2020-02-14
Attending: FAMILY MEDICINE | Admitting: FAMILY MEDICINE
Payer: COMMERCIAL

## 2020-02-14 DIAGNOSIS — Z12.31 ENCOUNTER FOR SCREENING MAMMOGRAM FOR BREAST CANCER: ICD-10-CM

## 2020-02-14 PROCEDURE — 77067 SCR MAMMO BI INCL CAD: CPT

## 2020-02-17 LAB
COPATH REPORT: NORMAL
PAP: NORMAL

## 2020-02-18 LAB
FINAL DIAGNOSIS: NORMAL
HPV HR 12 DNA CVX QL NAA+PROBE: NEGATIVE
HPV16 DNA SPEC QL NAA+PROBE: NEGATIVE
HPV18 DNA SPEC QL NAA+PROBE: NEGATIVE
SPECIMEN DESCRIPTION: NORMAL
SPECIMEN SOURCE CVX/VAG CYTO: NORMAL

## 2021-01-15 ENCOUNTER — HEALTH MAINTENANCE LETTER (OUTPATIENT)
Age: 56
End: 2021-01-15

## 2021-03-20 ENCOUNTER — HEALTH MAINTENANCE LETTER (OUTPATIENT)
Age: 56
End: 2021-03-20

## 2021-04-17 DIAGNOSIS — I10 ESSENTIAL HYPERTENSION: ICD-10-CM

## 2021-04-17 RX ORDER — LISINOPRIL 40 MG/1
TABLET ORAL
Qty: 90 TABLET | Refills: 0 | Status: SHIPPED | OUTPATIENT
Start: 2021-04-17 | End: 2021-08-16

## 2021-04-17 NOTE — TELEPHONE ENCOUNTER
,  --Please contact patient and ask to schedule an annual visit/physical.    --She can get annual labs drawn at that visit.    ---Prescription approved per [facility] Refill Protocol.       Arleth Palomino RN BSN     [facility]                  --Last visit:  2/12/2020     --Future Visit: none

## 2021-08-12 DIAGNOSIS — I10 ESSENTIAL HYPERTENSION: ICD-10-CM

## 2021-08-16 RX ORDER — LISINOPRIL 40 MG/1
TABLET ORAL
Qty: 30 TABLET | Refills: 0 | Status: SHIPPED | OUTPATIENT
Start: 2021-08-16 | End: 2021-09-15

## 2021-08-16 NOTE — TELEPHONE ENCOUNTER
Routing refill request to provider for review/approval because:  --Last order sent 4/17/21 was jonathan refill with reminder.  --Overdue for annual visit.  --voice mail reminder left for patient 4/22/21.    --Last visit:  2/12/2020     --Future Visit: NONE.

## 2021-09-15 ENCOUNTER — MYC REFILL (OUTPATIENT)
Dept: FAMILY MEDICINE | Facility: CLINIC | Age: 56
End: 2021-09-15

## 2021-09-15 DIAGNOSIS — I10 ESSENTIAL HYPERTENSION: ICD-10-CM

## 2021-09-20 NOTE — TELEPHONE ENCOUNTER
Routing refill request to provider for review/approval because:  Labs not current:  Serum creatinine, potassium  BP out of [facility] protocol range in last 12 months  BP Readings from Last 3 Encounters:   02/12/20 136/88   01/17/20 (!) 140/110   01/15/20 (!) 150/98     MARAL AndersenN RN  [facility]

## 2021-09-27 RX ORDER — LISINOPRIL 40 MG/1
40 TABLET ORAL DAILY
Qty: 30 TABLET | Refills: 0 | Status: SHIPPED | OUTPATIENT
Start: 2021-09-27 | End: 2021-10-07

## 2021-10-06 NOTE — PROGRESS NOTES
ASSESSMENT AND PLANS:       ESSENTIAL HYPERTENSION   - B/P at goal, 135/80  - advised low salt diet, activity as tolerated  - Continue Lisinopril 40 mg daily  - LABS FOR TODAY   ->BMP    ->TSH with reflex T4    ->CBC with platelets     ->Albumin random urine    Need for prophylactic vaccination and inoculation against influenza  - INFLUENZA QUAD, RECOMBINANT, P-FREE (RIV4) (FLUBLOK)    Lipid screening  - Lipid Profile (Chol, Trig, HDL, LDL calc); Future  - Lipid Profile (Chol, Trig, HDL, LDL calc)    Prediabetes  Hgba1c 6.5 and repeat 6.6   - new diabetic - will schedule follow up  - Hemoglobin A1c; Future  - Hemoglobin A1c    Colon cancer screening  - Adult Gastro Ref - Procedure Only; Future        Subjective   Catia is a 56 year old who F with hx of HTN, allergic rhinitis, is here tody for yearly physical check ups and medication refills.      HPI   Catia is a 56 year old F with hx of HTN, allergic rhinitis, is here tody for yearly physical check ups.     Last clinic appt was on 1/17/2020. Has not had any lab wok for more than a year. Catia is doing pretty well, trying to maintain a healthy life style, and diet. She smokes and drinks occasionally. No health related concerns. She is fasting today to complete required labs. Has been on lisinopril, no s/e    Denies fever, SOB, chest pain, n/v, vision change. No abdominal pain, no change in BM, urine. Her sleep and appetite is good. Mood is good as well.     Hypertension Follow-up       Do you check your blood pressure regularly outside of the clinic? Yes, BP always at goal     Are you following a low salt diet? Trying to maintain    Are your blood pressures ever more than 140 on the top number (systolic) OR more       than 90 on the bottom number (diastolic), for example 140/90? No       How many servings of fruits and vegetables do you eat daily?  2-3    On average, how many sweetened beverages do you drink each day (Examples: soda, juice, sweet tea, etc.  Do NOT  count diet or artificially sweetened beverages)?   0    How many days per week do you exercise enough to make your heart beat faster? Unspecified, Trying to maintain    How many days per week do you miss taking your medication? 0      Review of Systems   A 10 point ROS is negative otherwise mentioned in HPI       Objective    LMP 08/03/2015   Physical Exam           Physician Attestation   I, Aguilar Akers, was present with the medical/HEIKE student who participated in the service and in the documentation of the note.  I have verified the history and personally performed the physical exam and medical decision making.  I agree with the assessment and plan of care as documented in the note.          MD Maura Cr M.B.B.S.  [employer] Student

## 2021-10-07 ENCOUNTER — OFFICE VISIT (OUTPATIENT)
Dept: FAMILY MEDICINE | Facility: CLINIC | Age: 56
End: 2021-10-07
Payer: COMMERCIAL

## 2021-10-07 VITALS
SYSTOLIC BLOOD PRESSURE: 135 MMHG | BODY MASS INDEX: 34.27 KG/M2 | DIASTOLIC BLOOD PRESSURE: 92 MMHG | WEIGHT: 195 LBS | TEMPERATURE: 98 F | HEART RATE: 75 BPM | OXYGEN SATURATION: 98 %

## 2021-10-07 DIAGNOSIS — I10 ESSENTIAL HYPERTENSION: ICD-10-CM

## 2021-10-07 DIAGNOSIS — Z12.11 COLON CANCER SCREENING: ICD-10-CM

## 2021-10-07 DIAGNOSIS — Z23 NEED FOR PROPHYLACTIC VACCINATION AND INOCULATION AGAINST INFLUENZA: ICD-10-CM

## 2021-10-07 DIAGNOSIS — Z13.220 LIPID SCREENING: ICD-10-CM

## 2021-10-07 DIAGNOSIS — R73.03 PREDIABETES: ICD-10-CM

## 2021-10-07 LAB
ANION GAP SERPL CALCULATED.3IONS-SCNC: 5 MMOL/L (ref 3–14)
BUN SERPL-MCNC: 17 MG/DL (ref 7–30)
CALCIUM SERPL-MCNC: 9.7 MG/DL (ref 8.5–10.1)
CHLORIDE BLD-SCNC: 105 MMOL/L (ref 94–109)
CHOLEST SERPL-MCNC: 193 MG/DL
CO2 SERPL-SCNC: 26 MMOL/L (ref 20–32)
CREAT SERPL-MCNC: 0.62 MG/DL (ref 0.52–1.04)
ERYTHROCYTE [DISTWIDTH] IN BLOOD BY AUTOMATED COUNT: 13.6 % (ref 10–15)
FASTING STATUS PATIENT QL REPORTED: YES
GFR SERPL CREATININE-BSD FRML MDRD: >90 ML/MIN/1.73M2
GLUCOSE BLD-MCNC: 147 MG/DL (ref 70–99)
HBA1C MFR BLD: 6.5 % (ref 0–5.6)
HCT VFR BLD AUTO: 45.6 % (ref 35–47)
HDLC SERPL-MCNC: 45 MG/DL
HGB BLD-MCNC: 14.9 G/DL (ref 11.7–15.7)
LDLC SERPL CALC-MCNC: 123 MG/DL
MCH RBC QN AUTO: 28.2 PG (ref 26.5–33)
MCHC RBC AUTO-ENTMCNC: 32.7 G/DL (ref 31.5–36.5)
MCV RBC AUTO: 86 FL (ref 78–100)
NONHDLC SERPL-MCNC: 148 MG/DL
PLATELET # BLD AUTO: 155 10E3/UL (ref 150–450)
POTASSIUM BLD-SCNC: 4.3 MMOL/L (ref 3.4–5.3)
RBC # BLD AUTO: 5.28 10E6/UL (ref 3.8–5.2)
SODIUM SERPL-SCNC: 136 MMOL/L (ref 133–144)
TRIGL SERPL-MCNC: 127 MG/DL
TSH SERPL DL<=0.005 MIU/L-ACNC: 0.84 MU/L (ref 0.4–4)
WBC # BLD AUTO: 3.5 10E3/UL (ref 4–11)

## 2021-10-07 PROCEDURE — 85027 COMPLETE CBC AUTOMATED: CPT | Performed by: FAMILY MEDICINE

## 2021-10-07 PROCEDURE — 90471 IMMUNIZATION ADMIN: CPT | Performed by: FAMILY MEDICINE

## 2021-10-07 PROCEDURE — 80048 BASIC METABOLIC PNL TOTAL CA: CPT | Performed by: FAMILY MEDICINE

## 2021-10-07 PROCEDURE — 83036 HEMOGLOBIN GLYCOSYLATED A1C: CPT | Performed by: FAMILY MEDICINE

## 2021-10-07 PROCEDURE — 80061 LIPID PANEL: CPT | Performed by: FAMILY MEDICINE

## 2021-10-07 PROCEDURE — 90750 HZV VACC RECOMBINANT IM: CPT | Performed by: FAMILY MEDICINE

## 2021-10-07 PROCEDURE — 82043 UR ALBUMIN QUANTITATIVE: CPT | Performed by: FAMILY MEDICINE

## 2021-10-07 PROCEDURE — 84443 ASSAY THYROID STIM HORMONE: CPT | Performed by: FAMILY MEDICINE

## 2021-10-07 PROCEDURE — 90682 RIV4 VACC RECOMBINANT DNA IM: CPT | Performed by: FAMILY MEDICINE

## 2021-10-07 PROCEDURE — 36415 COLL VENOUS BLD VENIPUNCTURE: CPT | Performed by: FAMILY MEDICINE

## 2021-10-07 PROCEDURE — 90472 IMMUNIZATION ADMIN EACH ADD: CPT | Performed by: FAMILY MEDICINE

## 2021-10-07 PROCEDURE — 99213 OFFICE O/P EST LOW 20 MIN: CPT | Mod: 25 | Performed by: FAMILY MEDICINE

## 2021-10-07 RX ORDER — LISINOPRIL 40 MG/1
40 TABLET ORAL DAILY
Qty: 90 TABLET | Refills: 1 | Status: SHIPPED | OUTPATIENT
Start: 2021-10-07 | End: 2022-05-13

## 2021-10-07 NOTE — NURSING NOTE
Prior to immunization administration, verified patients identity using patient s name and date of birth. Please see Immunization Activity for additional information.     Screening Questionnaire for Adult Immunization    Are you sick today?   No   Do you have allergies to medications, food, a vaccine component or latex?   No   Have you ever had a serious reaction after receiving a vaccination?   No   Do you have a long-term health problem with heart, lung, kidney, or metabolic disease (e.g., diabetes), asthma, a blood disorder, no spleen, complement component deficiency, a cochlear implant, or a spinal fluid leak?  Are you on long-term aspirin therapy?   No   Do you have cancer, leukemia, HIV/AIDS, or any other immune system problem?   No   Do you have a parent, brother, or sister with an immune system problem?   No   In the past 3 months, have you taken medications that affect  your immune system, such as prednisone, other steroids, or anticancer drugs; drugs for the treatment of rheumatoid arthritis, Crohn s disease, or psoriasis; or have you had radiation treatments?   No   Have you had a seizure, or a brain or other nervous system problem?   No   During the past year, have you received a transfusion of blood or blood    products, or been given immune (gamma) globulin or antiviral drug?   No   For women: Are you pregnant or is there a chance you could become       pregnant during the next month?   No   Have you received any vaccinations in the past 4 weeks?   No     Immunization questionnaire answers were all negative.        Per orders of Dr. Akers, injection of flu and shingrix given by Alexx Campos. Patient instructed to remain in clinic for 15 minutes afterwards, and to report any adverse reaction to me immediately.       Screening performed by Alexx Campos on 10/7/2021 at 11:46 AM.

## 2021-10-08 ENCOUNTER — TELEPHONE (OUTPATIENT)
Dept: FAMILY MEDICINE | Facility: CLINIC | Age: 56
End: 2021-10-08

## 2021-10-08 DIAGNOSIS — D72.9 ABNORMAL WHITE BLOOD CELL: Primary | ICD-10-CM

## 2021-10-08 PROBLEM — E11.9 DIABETES MELLITUS, TYPE 2 (H): Status: ACTIVE | Noted: 2021-10-08

## 2021-10-08 LAB
CREAT UR-MCNC: 162 MG/DL
MICROALBUMIN UR-MCNC: 16 MG/L
MICROALBUMIN/CREAT UR: 9.88 MG/G CR (ref 0–25)

## 2021-10-08 NOTE — TELEPHONE ENCOUNTER
RN, left pt VM. When she calls back please inform her of below results -     Blood sugars show that she is diabetic. Diabetes is diagnosed with hgba1c 6.5 and above. Her hgba1c was 6.5, repeat lab showed 6.6. As this is new diagnosis, please schedule virtual appt to discuss diagnosis.     LDL (bad cholesterol) elevated.   Your 10-year heart disease risk score, which is calculated using the factors shown below, is  17.3 %. When the risk score is this high, it is recommended that we consider starting a statin (cholesterol-lowering) medication to reduce your risk of heart attack and stroke. Please schedule a virtual visit to discuss starting this medication if you are open to doing so.   The 10-year ASCVD risk score (Hazel FERRER Jr., et al., 2013) is: 17.3%    Values used to calculate the score:      Age: 56 years      Sex: Female      Is Non- : No      Diabetic: Yes      Tobacco smoker: Yes      Systolic Blood Pressure: 135 mmHg      Is BP treated: Yes      HDL Cholesterol: 45 mg/dL      Total Cholesterol: 193 mg/dL     White count slightly low, recheck in 4-6 weeks. Lab only visit.    Rest of labs normal.     Thanks!  DM

## 2021-10-08 NOTE — TELEPHONE ENCOUNTER
Patient notified of Dr. Akers's message below. She voiced understading and wrote down f/u recommendations. Transferred call to scheduling to assist with booking virtual appointment and lab visit.    Tosha Renner RN

## 2021-10-18 ENCOUNTER — TELEPHONE (OUTPATIENT)
Dept: FAMILY MEDICINE | Facility: CLINIC | Age: 56
End: 2021-10-18

## 2021-10-18 NOTE — TELEPHONE ENCOUNTER
Patient's  called RN triage wondering about pts new diabetic diagnosis follow up. Per 10/8/21 TE, Dr. Akers wants a virtual appt set up. RN called patient and set up 10/21/21 telephone visit.    MARAL AndersenN RN  [facility]

## 2021-10-21 ENCOUNTER — VIRTUAL VISIT (OUTPATIENT)
Dept: FAMILY MEDICINE | Facility: CLINIC | Age: 56
End: 2021-10-21
Payer: COMMERCIAL

## 2021-10-21 DIAGNOSIS — E11.9 TYPE 2 DIABETES MELLITUS WITHOUT COMPLICATION, WITHOUT LONG-TERM CURRENT USE OF INSULIN (H): Primary | ICD-10-CM

## 2021-10-21 DIAGNOSIS — E78.5 HYPERLIPIDEMIA, UNSPECIFIED HYPERLIPIDEMIA TYPE: ICD-10-CM

## 2021-10-21 PROCEDURE — 99214 OFFICE O/P EST MOD 30 MIN: CPT | Mod: 95 | Performed by: FAMILY MEDICINE

## 2021-10-21 RX ORDER — FLASH GLUCOSE SENSOR
1 KIT MISCELLANEOUS
Qty: 2 EACH | Refills: 5 | Status: SHIPPED | OUTPATIENT
Start: 2021-10-21

## 2021-10-21 RX ORDER — FLASH GLUCOSE SCANNING READER
1 EACH MISCELLANEOUS ONCE
Qty: 1 EACH | Refills: 0 | Status: SHIPPED | OUTPATIENT
Start: 2021-10-21 | End: 2021-10-21

## 2021-10-21 NOTE — PROGRESS NOTES
Catia is a 56 year old who is being evaluated via a billable telephone visit.      What phone number would you like to be contacted at? [phone]  How would you like to obtain your AVS? MyChart    Assessment & Plan       1. Type 2 diabetes mellitus without complication, without long-term current use of insulin (H)  - discussed new diagnosis and complications  - continue lifestyle changes   - recheck in 3 months   - will get eye exam   - AMB Adult Diabetes Educator Referral; Future  - Continuous Blood Gluc  (FREESTYLE BETH 14 DAY READER) GABBIE; 1 each once for 1 dose Use to read blood sugars as per 's instructions.  Dispense: 1 each; Refill: 0  - Continuous Blood Gluc Sensor (FREESTYLE BETH 14 DAY SENSOR) MISC; 1 each every 14 days Change every 14 days.  Dispense: 2 each; Refill: 5    2. Hyperlipidemia, unspecified hyperlipidemia type  The 10-year ASCVD risk score (Hazel FERRER Jr., et al., 2013) is: 17.3%    Values used to calculate the score:      Age: 56 years      Sex: Female      Is Non- : No      Diabetic: Yes      Tobacco smoker: Yes      Systolic Blood Pressure: 135 mmHg      Is BP treated: Yes      HDL Cholesterol: 45 mg/dL      Total Cholesterol: 193 mg/dL   - defer statin  - continue lifestyle modifications  - recheck levels in 3 months and statin if needed       Return in about 3 months (around 1/21/2022) for Routine Visit.    Aguilar Akers MD  [facility]   Catia is a 56 year old who presents for the following health issues     HPI   Patient would like to discuss about new diagnosis of diabetics   Diabetes Follow-up    She is limiting carbohydrates.  She stopped smoking.  No current symptoms     The 10-year ASCVD risk score (Hazel FERRER Jr., et al., 2013) is: 17.3%    Values used to calculate the score:      Age: 56 years      Sex: Female      Is Non- : No      Diabetic: Yes      Tobacco smoker:  Yes      Systolic Blood Pressure: 135 mmHg      Is BP treated: Yes      HDL Cholesterol: 45 mg/dL      Total Cholesterol: 193 mg/dL      BP Readings from Last 2 Encounters:   10/07/21 (!) 135/92   02/12/20 136/88     Hemoglobin A1C (%)   Date Value   10/07/2021 6.5 (H)   09/08/2015 5.9     LDL Cholesterol Calculated (mg/dL)   Date Value   10/07/2021 123 (H)   10/02/2019 100 (H)   09/25/2017 71         Review of Systems         Objective           Vitals:  No vitals were obtained today due to virtual visit.    Physical Exam   healthy, alert and no distress  PSYCH: Alert and oriented times 3; coherent speech, normal   rate and volume, able to articulate logical thoughts, able   to abstract reason, no tangential thoughts, no hallucinations   or delusions  Her affect is normal  RESP: No cough, no audible wheezing, able to talk in full sentences  Remainder of exam unable to be completed due to telephone visits                Phone call duration: 16 minutes

## 2021-10-21 NOTE — PATIENT INSTRUCTIONS
Blood sugar goals -   Fasting blood sugar goal less than 130   Blood glucose two after meals goal: < 180

## 2021-11-02 ENCOUNTER — TRANSFERRED RECORDS (OUTPATIENT)
Dept: HEALTH INFORMATION MANAGEMENT | Facility: CLINIC | Age: 56
End: 2021-11-02
Payer: COMMERCIAL

## 2021-11-02 LAB — RETINOPATHY: NEGATIVE

## 2021-11-03 ENCOUNTER — VIRTUAL VISIT (OUTPATIENT)
Dept: EDUCATION SERVICES | Facility: CLINIC | Age: 56
End: 2021-11-03
Attending: FAMILY MEDICINE
Payer: COMMERCIAL

## 2021-11-03 DIAGNOSIS — E11.9 TYPE 2 DIABETES MELLITUS WITHOUT COMPLICATION, WITHOUT LONG-TERM CURRENT USE OF INSULIN (H): ICD-10-CM

## 2021-11-03 PROCEDURE — G0108 DIAB MANAGE TRN  PER INDIV: HCPCS | Performed by: DIETITIAN, REGISTERED

## 2021-11-03 NOTE — PATIENT INSTRUCTIONS
-Count carbs with goal of 45-60 grams/meal & 15 grams/snack  -Avoid high saturated fat foods like beef, cheese & butter - choose leaner protein & more heart healthy fats  -Continue exercise!  -Start using Freestyle Renata and scan at least every 8 hrs - fasting, before & 2 hrs after meals + before bed    Call or send a [email or web address] message with any questions or concerns    Sharron Silva RD, LD, [facility]   Diabetes Education Triage Line: [phone]  Diabetes Education Appointment [phone]

## 2021-11-03 NOTE — LETTER
11/3/2021         RE: Catia James  [address]  [facility] [address]        Dear Colleague,    Thank you for referring your patient, Catia James, to the [facility]. Please see a copy of my visit note below.    Diabetes Self-Management Education & Support    Presents for: Initial Assessment for new diagnosis    Type of service:  Video Visit    If the video visit is dropped, the video visit invitation should be resent by: Send to e-mail at: [email or web address]    Originating Location (pt. Location): Home  Distant Location (provider location): Home  Mode of Communication:  Video Conference via [employer]    Video Start Time: 12:31p  Video End Time (time video stopped): 1:31p    How would patient like to obtain AVS? MyChart        ASSESSMENT:  Patient was initially very freaked out, upset by diagnosis. Started making changes to diet & increasing exercise immediately - concerned about her cholesterol which is also high and so feels she can't do a keto diet. Discussed healthy, balanced diet to support improved blood sugar control + heart health. She has Renata 14-day CGMS but has not started using it yet - asking for guidelines on goal ranges, when to test, what numbers she should look for. She is walking most days a week for anywhere from 30-60 minutes. She is very engaged in visit, asks good questions.     Patient's most recent   Lab Results   Component Value Date    A1C 6.5 10/07/2021    A1C 5.9 09/08/2015    is meeting goal of <7.0    Diabetes knowledge and skills assessment:   Patient is knowledgeable in diabetes management concepts related to: none, new diagnosis     Continue education with the following diabetes management concepts: Healthy Eating, Being Active, Monitoring, Taking Medication, Problem Solving, Reducing Risks and Healthy Coping    Based on learning assessment above, most appropriate setting for further diabetes education would be: Group class or  Individual setting.    INTERVENTIONS:    Education provided today on:  AADE Self-Care Behaviors:  Diabetes Pathophysiology  Healthy Eating: carbohydrate counting, consistency in amount, composition, and timing of food intake, weight reduction, heart healthy diet, portion control, plate planning method and label reading  Being Active: relationship to blood glucose, describe appropriate activity program and precautions to take  Monitoring: purpose, individual blood glucose targets, frequency of monitoring and reviewed important data like time in target range with Renata products     Opportunities for ongoing education and support in diabetes-self management were discussed. Pt verbalized understanding of concepts discussed and recommendations provided today.       Education Materials Provided:   [employer] Understanding Diabetes Booklet, BG Log Sheet, My Plate Planner and TLC Diet Guidelines  - sent via email          PLAN  Start counting carbs at meals & snacks - goal of 45-60 grams/meal & 15 grams/snack  Utilize Freestyle Renata CGMS to check blood sugars at minimum every 8 hrs  Continue frequent walking     Topics to cover at upcoming visits: Taking Medication, Problem Solving, Reducing Risks and Healthy Coping  Follow-up: 12/21    See Goals Section for co-developed, patient-stated behavior change goals.  AVS provided to patient today.    SUBJECTIVE / OBJECTIVE:  Presents for: Initial Assessment for new diagnosis  Accompanied by: Self  Diabetes education in the past 24mo: No  Focus of Visit: CGM, Monitoring, Healthy Eating  Type of CGM visit: Personal CGM Start  Diabetes type: Type 2  How confident are you filling out medical forms by yourself:: Not Assessed  Difficulty affording diabetes testing supplies?: Sometimes  Other concerns:: None  Cultural Influences/Ethnic Background:  Mexican    Diabetes Symptoms & Complications:  Fatigue: No  Neuropathy: No  Polydipsia: No  Polyphagia: No  Polyuria: No  Visual  "change: No  Slow healing wounds: No  Symptom course: Stable  Weight trend: Stable  Complications assessed today?: No    Patient Problem List and Family Medical History reviewed for relevant medical history, current medical status, and diabetes risk factors.    Vitals:  LMP 08/03/2015   Estimated body mass index is 34.27 kg/m  as calculated from the following:    Height as of 2/12/20: 1.607 m (5' 3.25\").    Weight as of 10/7/21: 88.5 kg (195 lb).   Last 3 BP:   BP Readings from Last 3 Encounters:   10/07/21 (!) 135/92   02/12/20 136/88   01/17/20 (!) 140/110       History   Smoking Status     Light Tobacco Smoker     Packs/day: 0.50     Years: 25.00     Types: Cigarettes     Last attempt to quit: 7/28/2014   Smokeless Tobacco     Never Used       Labs:  Lab Results   Component Value Date    A1C 6.5 10/07/2021    A1C 5.9 09/08/2015     Lab Results   Component Value Date     10/07/2021     02/12/2020     Lab Results   Component Value Date     10/07/2021     10/02/2019     HDL Cholesterol   Date Value Ref Range Status   10/02/2019 57 >49 mg/dL Final     Direct Measure HDL   Date Value Ref Range Status   10/07/2021 45 (L) >=50 mg/dL Final   ]  GFR Estimate   Date Value Ref Range Status   10/07/2021 >90 >60 mL/min/1.73m2 Final     Comment:     As of July 11, 2021, eGFR is calculated by the CKD-EPI creatinine equation, without race adjustment. eGFR can be influenced by muscle mass, exercise, and diet. The reported eGFR is an estimation only and is only applicable if the renal function is stable.   02/12/2020 >90 >60 mL/min/[1.73_m2] Final     Comment:     Non  GFR Calc  Starting 12/18/2018, serum creatinine based estimated GFR (eGFR) will be   calculated using the Chronic Kidney Disease Epidemiology Collaboration   (CKD-EPI) equation.       GFR Estimate If Black   Date Value Ref Range Status   02/12/2020 >90 >60 mL/min/[1.73_m2] Final     Comment:      GFR " Calc  Starting 12/18/2018, serum creatinine based estimated GFR (eGFR) will be   calculated using the Chronic Kidney Disease Epidemiology Collaboration   (CKD-EPI) equation.       Lab Results   Component Value Date    CR 0.62 10/07/2021    CR 0.57 02/12/2020     No results found for: MICROALBUMIN    Healthy Eating:  Healthy Eating Assessed Today: Yes  Breakfast: egg beaters + spinach + parmesan w/ olive oil, 2 cups coffee OR steel cut oats, 1/4 cup blueberries  Lunch: can of tuna on lettuce, cherry tomatoes OR chicken salad  Dinner: white bean soup + LS chicken broth, spinach, leeks OR chicken OR pork loin  Snacks: almonds OR sheryl lettuce OR bowl of oatmeal OR blueberries  Beverages: Water, Coffee (96 oz/day water)  Has patient met with a dietitian in the past?: No    Being Active:  Being Active Assessed Today: Yes  Exercise:: Yes  Days per week of moderate to strenuous exercise (like a brisk walk): 5  On average, minutes per day of exercise at this level: 40  How intense was your typical exercise? : Moderate (like brisk walking) (2-4 miles/walk)  Exercise Minutes per Week: 200  Barrier to exercise: None    Monitoring:  Monitoring Assessed Today: Yes  Did patient bring glucose meter to appointment? : No  Blood Glucose Meter: CGM  Times checking blood sugar at home (number): Never    Glucose data:  Patient has not started checking yet - plans to utilize [employer] 14-day CGMS    Taking Medications:  Taking Medication Assessed Today: Yes  Current Treatments: None    Problem Solving:  Problem Solving Assessed Today: No  Is the patient at risk for hypoglycemia?: No  Is the patient at risk for DKA?: No    Reducing Risks:  Reducing Risks Assessed Today: Yes  Diabetes Risks: Age over 45 years, Family History, History of gestational diabetes, Hyperlipidemia, Sedentary Lifestyle, Ethnicity  Additional female risks: Gestational Diabetes  CAD Risks: Diabetes Mellitus, Obesity, Hypertension, Dyslipidemia, Sedentary  lifestyle  Has dilated eye exam at least once a year?: Yes  Sees dentist every 6 months?: No  Feet checked by healthcare provider in the last year?: No    Healthy Coping:  Healthy Coping Assessed Today: Yes  Emotional response to diabetes: Ready to learn, Fear/Anxiety  Informal Support system:: Family  Stage of change: ACTION (Actively working towards change)  Support resources: None  Patient Activation Measure Survey Score:  JACQUIE Score (Last Two) 6/3/2013   JACQUIE Raw Score 49   Activation Score 82.8   JACQUIE Level 4             Sharron Silva RD, LD, [facility]   Time Spent: 60 minutes  Encounter Type: Individual        Any diabetes medication dose changes were made via the Certified Diabetes Care & Education Protocol in collaboration with the patient's referring provider. A copy of this encounter was shared with the provider.

## 2021-11-03 NOTE — PROGRESS NOTES
Diabetes Self-Management Education & Support    Presents for: Initial Assessment for new diagnosis    Type of service:  Video Visit    If the video visit is dropped, the video visit invitation should be resent by: Send to e-mail at: [email or web address]    Originating Location (pt. Location): Home  Distant Location (provider location): Home  Mode of Communication:  Video Conference via [email or web address]    Video Start Time: 12:31p  Video End Time (time video stopped): 1:31p    How would patient like to obtain AVS? MyChart        ASSESSMENT:  Patient was initially very freaked out, upset by diagnosis. Started making changes to diet & increasing exercise immediately - concerned about her cholesterol which is also high and so feels she can't do a keto diet. Discussed healthy, balanced diet to support improved blood sugar control + heart health. She has Renata 14-day CGMS but has not started using it yet - asking for guidelines on goal ranges, when to test, what numbers she should look for. She is walking most days a week for anywhere from 30-60 minutes. She is very engaged in visit, asks good questions.     Patient's most recent   Lab Results   Component Value Date    A1C 6.5 10/07/2021    A1C 5.9 09/08/2015    is meeting goal of <7.0    Diabetes knowledge and skills assessment:   Patient is knowledgeable in diabetes management concepts related to: none, new diagnosis     Continue education with the following diabetes management concepts: Healthy Eating, Being Active, Monitoring, Taking Medication, Problem Solving, Reducing Risks and Healthy Coping    Based on learning assessment above, most appropriate setting for further diabetes education would be: Group class or Individual setting.    INTERVENTIONS:    Education provided today on:  AADE Self-Care Behaviors:  Diabetes Pathophysiology  Healthy Eating: carbohydrate counting, consistency in amount, composition, and timing of food intake, weight reduction, heart healthy  diet, portion control, plate planning method and label reading  Being Active: relationship to blood glucose, describe appropriate activity program and precautions to take  Monitoring: purpose, individual blood glucose targets, frequency of monitoring and reviewed important data like time in target range with Renata products     Opportunities for ongoing education and support in diabetes-self management were discussed. Pt verbalized understanding of concepts discussed and recommendations provided today.       Education Materials Provided:   [employer] Understanding Diabetes Booklet, BG Log Sheet, My Plate Planner and TLC Diet Guidelines  - sent via email          PLAN  Start counting carbs at meals & snacks - goal of 45-60 grams/meal & 15 grams/snack  Utilize Freestyle Renata CGMS to check blood sugars at minimum every 8 hrs  Continue frequent walking     Topics to cover at upcoming visits: Taking Medication, Problem Solving, Reducing Risks and Healthy Coping  Follow-up: 12/21    See Goals Section for co-developed, patient-stated behavior change goals.  AVS provided to patient today.    SUBJECTIVE / OBJECTIVE:  Presents for: Initial Assessment for new diagnosis  Accompanied by: Self  Diabetes education in the past 24mo: No  Focus of Visit: CGM, Monitoring, Healthy Eating  Type of CGM visit: Personal CGM Start  Diabetes type: Type 2  How confident are you filling out medical forms by yourself:: Not Assessed  Difficulty affording diabetes testing supplies?: Sometimes  Other concerns:: None  Cultural Influences/Ethnic Background:  Mexican    Diabetes Symptoms & Complications:  Fatigue: No  Neuropathy: No  Polydipsia: No  Polyphagia: No  Polyuria: No  Visual change: No  Slow healing wounds: No  Symptom course: Stable  Weight trend: Stable  Complications assessed today?: No    Patient Problem List and Family Medical History reviewed for relevant medical history, current medical status, and diabetes risk  "factors.    Vitals:  LMP 08/03/2015   Estimated body mass index is 34.27 kg/m  as calculated from the following:    Height as of 2/12/20: 1.607 m (5' 3.25\").    Weight as of 10/7/21: 88.5 kg (195 lb).   Last 3 BP:   BP Readings from Last 3 Encounters:   10/07/21 (!) 135/92   02/12/20 136/88   01/17/20 (!) 140/110       History   Smoking Status     Light Tobacco Smoker     Packs/day: 0.50     Years: 25.00     Types: Cigarettes     Last attempt to quit: 7/28/2014   Smokeless Tobacco     Never Used       Labs:  Lab Results   Component Value Date    A1C 6.5 10/07/2021    A1C 5.9 09/08/2015     Lab Results   Component Value Date     10/07/2021     02/12/2020     Lab Results   Component Value Date     10/07/2021     10/02/2019     HDL Cholesterol   Date Value Ref Range Status   10/02/2019 57 >49 mg/dL Final     Direct Measure HDL   Date Value Ref Range Status   10/07/2021 45 (L) >=50 mg/dL Final   ]  GFR Estimate   Date Value Ref Range Status   10/07/2021 >90 >60 mL/min/1.73m2 Final     Comment:     As of July 11, 2021, eGFR is calculated by the CKD-EPI creatinine equation, without race adjustment. eGFR can be influenced by muscle mass, exercise, and diet. The reported eGFR is an estimation only and is only applicable if the renal function is stable.   02/12/2020 >90 >60 mL/min/[1.73_m2] Final     Comment:     Non  GFR Calc  Starting 12/18/2018, serum creatinine based estimated GFR (eGFR) will be   calculated using the Chronic Kidney Disease Epidemiology Collaboration   (CKD-EPI) equation.       GFR Estimate If Black   Date Value Ref Range Status   02/12/2020 >90 >60 mL/min/[1.73_m2] Final     Comment:      GFR Calc  Starting 12/18/2018, serum creatinine based estimated GFR (eGFR) will be   calculated using the Chronic Kidney Disease Epidemiology Collaboration   (CKD-EPI) equation.       Lab Results   Component Value Date    CR 0.62 10/07/2021    CR 0.57 " 02/12/2020     No results found for: MICROALBUMIN    Healthy Eating:  Healthy Eating Assessed Today: Yes  Breakfast: egg beaters + spinach + parmesan w/ olive oil, 2 cups coffee OR steel cut oats, 1/4 cup blueberries  Lunch: can of tuna on lettuce, cherry tomatoes OR chicken salad  Dinner: white bean soup + LS chicken broth, spinach, leeks OR chicken OR pork loin  Snacks: almonds OR sheryl lettuce OR bowl of oatmeal OR blueberries  Beverages: Water, Coffee (96 oz/day water)  Has patient met with a dietitian in the past?: No    Being Active:  Being Active Assessed Today: Yes  Exercise:: Yes  Days per week of moderate to strenuous exercise (like a brisk walk): 5  On average, minutes per day of exercise at this level: 40  How intense was your typical exercise? : Moderate (like brisk walking) (2-4 miles/walk)  Exercise Minutes per Week: 200  Barrier to exercise: None    Monitoring:  Monitoring Assessed Today: Yes  Did patient bring glucose meter to appointment? : No  Blood Glucose Meter: CGM  Times checking blood sugar at home (number): Never    Glucose data:  Patient has not started checking yet - plans to utilize [employer] 14-day CGMS    Taking Medications:  Taking Medication Assessed Today: Yes  Current Treatments: None    Problem Solving:  Problem Solving Assessed Today: No  Is the patient at risk for hypoglycemia?: No  Is the patient at risk for DKA?: No    Reducing Risks:  Reducing Risks Assessed Today: Yes  Diabetes Risks: Age over 45 years, Family History, History of gestational diabetes, Hyperlipidemia, Sedentary Lifestyle, Ethnicity  Additional female risks: Gestational Diabetes  CAD Risks: Diabetes Mellitus, Obesity, Hypertension, Dyslipidemia, Sedentary lifestyle  Has dilated eye exam at least once a year?: Yes  Sees dentist every 6 months?: No  Feet checked by healthcare provider in the last year?: No    Healthy Coping:  Healthy Coping Assessed Today: Yes  Emotional response to diabetes: Ready to  learn, Fear/Anxiety  Informal Support system:: Family  Stage of change: ACTION (Actively working towards change)  Support resources: None  Patient Activation Measure Survey Score:  JACQUIE Score (Last Two) 6/3/2013   JACQUIE Raw Score 49   Activation Score 82.8   JACQUIE Level 4             Sharron Silva RD, LD, [facility]   Time Spent: 60 minutes  Encounter Type: Individual        Any diabetes medication dose changes were made via the Certified Diabetes Care & Education Protocol in collaboration with the patient's referring provider. A copy of this encounter was shared with the provider.

## 2021-11-09 ENCOUNTER — LAB (OUTPATIENT)
Dept: LAB | Facility: CLINIC | Age: 56
End: 2021-11-09
Payer: COMMERCIAL

## 2021-11-09 DIAGNOSIS — D72.9 ABNORMAL WHITE BLOOD CELL: ICD-10-CM

## 2021-11-09 LAB
BASOPHILS # BLD AUTO: 0 10E3/UL (ref 0–0.2)
BASOPHILS NFR BLD AUTO: 0 %
EOSINOPHIL # BLD AUTO: 0.1 10E3/UL (ref 0–0.7)
EOSINOPHIL NFR BLD AUTO: 3 %
ERYTHROCYTE [DISTWIDTH] IN BLOOD BY AUTOMATED COUNT: 13.1 % (ref 10–15)
HCT VFR BLD AUTO: 44.8 % (ref 35–47)
HGB BLD-MCNC: 14.3 G/DL (ref 11.7–15.7)
IMM GRANULOCYTES # BLD: 0 10E3/UL
IMM GRANULOCYTES NFR BLD: 0 %
LYMPHOCYTES # BLD AUTO: 1.6 10E3/UL (ref 0.8–5.3)
LYMPHOCYTES NFR BLD AUTO: 41 %
MCH RBC QN AUTO: 27.8 PG (ref 26.5–33)
MCHC RBC AUTO-ENTMCNC: 31.9 G/DL (ref 31.5–36.5)
MCV RBC AUTO: 87 FL (ref 78–100)
MONOCYTES # BLD AUTO: 0.3 10E3/UL (ref 0–1.3)
MONOCYTES NFR BLD AUTO: 9 %
NEUTROPHILS # BLD AUTO: 1.9 10E3/UL (ref 1.6–8.3)
NEUTROPHILS NFR BLD AUTO: 48 %
PLATELET # BLD AUTO: 137 10E3/UL (ref 150–450)
RBC # BLD AUTO: 5.15 10E6/UL (ref 3.8–5.2)
WBC # BLD AUTO: 4 10E3/UL (ref 4–11)

## 2021-11-09 PROCEDURE — 85025 COMPLETE CBC W/AUTO DIFF WBC: CPT

## 2021-11-09 PROCEDURE — 36415 COLL VENOUS BLD VENIPUNCTURE: CPT

## 2021-11-17 DIAGNOSIS — R79.89 ABNORMAL CBC: Primary | ICD-10-CM

## 2021-12-08 ENCOUNTER — LAB (OUTPATIENT)
Dept: LAB | Facility: CLINIC | Age: 56
End: 2021-12-08
Payer: COMMERCIAL

## 2021-12-08 DIAGNOSIS — Z11.4 SCREENING FOR HIV (HUMAN IMMUNODEFICIENCY VIRUS): ICD-10-CM

## 2021-12-08 DIAGNOSIS — R79.89 ABNORMAL CBC: ICD-10-CM

## 2021-12-08 LAB
BASOPHILS # BLD AUTO: 0 10E3/UL (ref 0–0.2)
BASOPHILS NFR BLD AUTO: 0 %
EOSINOPHIL # BLD AUTO: 0.2 10E3/UL (ref 0–0.7)
EOSINOPHIL NFR BLD AUTO: 3 %
ERYTHROCYTE [DISTWIDTH] IN BLOOD BY AUTOMATED COUNT: 13.2 % (ref 10–15)
HCT VFR BLD AUTO: 44 % (ref 35–47)
HGB BLD-MCNC: 14.1 G/DL (ref 11.7–15.7)
HIV 1+2 AB+HIV1 P24 AG SERPL QL IA: NONREACTIVE
IMM GRANULOCYTES # BLD: 0 10E3/UL
IMM GRANULOCYTES NFR BLD: 0 %
LYMPHOCYTES # BLD AUTO: 2.2 10E3/UL (ref 0.8–5.3)
LYMPHOCYTES NFR BLD AUTO: 44 %
MCH RBC QN AUTO: 28.2 PG (ref 26.5–33)
MCHC RBC AUTO-ENTMCNC: 32 G/DL (ref 31.5–36.5)
MCV RBC AUTO: 88 FL (ref 78–100)
MONOCYTES # BLD AUTO: 0.4 10E3/UL (ref 0–1.3)
MONOCYTES NFR BLD AUTO: 8 %
NEUTROPHILS # BLD AUTO: 2.2 10E3/UL (ref 1.6–8.3)
NEUTROPHILS NFR BLD AUTO: 45 %
PLATELET # BLD AUTO: 160 10E3/UL (ref 150–450)
RBC # BLD AUTO: 5 10E6/UL (ref 3.8–5.2)
WBC # BLD AUTO: 5 10E3/UL (ref 4–11)

## 2021-12-08 PROCEDURE — 87389 HIV-1 AG W/HIV-1&-2 AB AG IA: CPT

## 2021-12-08 PROCEDURE — 36415 COLL VENOUS BLD VENIPUNCTURE: CPT

## 2021-12-08 PROCEDURE — 85025 COMPLETE CBC W/AUTO DIFF WBC: CPT

## 2022-02-13 ENCOUNTER — HEALTH MAINTENANCE LETTER (OUTPATIENT)
Age: 57
End: 2022-02-13

## 2022-02-22 ENCOUNTER — LAB (OUTPATIENT)
Dept: LAB | Facility: CLINIC | Age: 57
End: 2022-02-22
Payer: COMMERCIAL

## 2022-02-22 DIAGNOSIS — E11.9 DIABETES MELLITUS, TYPE 2 (H): Primary | ICD-10-CM

## 2022-02-22 LAB — HBA1C MFR BLD: 5.3 % (ref 0–5.6)

## 2022-02-22 PROCEDURE — 83036 HEMOGLOBIN GLYCOSYLATED A1C: CPT

## 2022-02-22 PROCEDURE — 36415 COLL VENOUS BLD VENIPUNCTURE: CPT

## 2022-04-10 ENCOUNTER — HEALTH MAINTENANCE LETTER (OUTPATIENT)
Age: 57
End: 2022-04-10

## 2022-05-14 ENCOUNTER — LAB (OUTPATIENT)
Dept: LAB | Facility: CLINIC | Age: 57
End: 2022-05-14
Payer: COMMERCIAL

## 2022-05-14 DIAGNOSIS — R73.01 ELEVATED FASTING BLOOD SUGAR: ICD-10-CM

## 2022-05-14 LAB — HBA1C MFR BLD: 5.4 % (ref 0–5.6)

## 2022-05-14 PROCEDURE — 83036 HEMOGLOBIN GLYCOSYLATED A1C: CPT

## 2022-05-14 PROCEDURE — 36415 COLL VENOUS BLD VENIPUNCTURE: CPT

## 2022-06-05 ENCOUNTER — HEALTH MAINTENANCE LETTER (OUTPATIENT)
Age: 57
End: 2022-06-05

## 2022-06-10 ENCOUNTER — MYC MEDICAL ADVICE (OUTPATIENT)
Dept: FAMILY MEDICINE | Facility: CLINIC | Age: 57
End: 2022-06-10
Payer: COMMERCIAL

## 2022-06-10 DIAGNOSIS — L91.8 SKIN TAG: Primary | ICD-10-CM

## 2022-06-14 NOTE — TELEPHONE ENCOUNTER
Sorry, I am unable to do it with physical. We can schedule another appt or referred to dermatology.  LATISHA

## 2022-06-14 NOTE — TELEPHONE ENCOUNTER
"Dr. Akers-Please review and advise if you refer to Dermatology for skin tag removal?    \"Hello.   I would like  be able to have some skin tags removed during my June 20 preventive care visit.  Hopefully this will be possible.  Thanks so much - Catia James \"    Thank you!  MARAL MurrayN, RN  [facility]    "

## 2022-06-14 NOTE — TELEPHONE ENCOUNTER
Writer responded via [employer].    MARAL MurrayN, RN  [facility]

## 2022-06-20 ENCOUNTER — OFFICE VISIT (OUTPATIENT)
Dept: FAMILY MEDICINE | Facility: CLINIC | Age: 57
End: 2022-06-20
Payer: COMMERCIAL

## 2022-06-20 VITALS
BODY MASS INDEX: 30.76 KG/M2 | WEIGHT: 173.6 LBS | OXYGEN SATURATION: 98 % | DIASTOLIC BLOOD PRESSURE: 80 MMHG | HEIGHT: 63 IN | SYSTOLIC BLOOD PRESSURE: 118 MMHG | HEART RATE: 69 BPM | RESPIRATION RATE: 20 BRPM | TEMPERATURE: 97.3 F

## 2022-06-20 DIAGNOSIS — I10 ESSENTIAL HYPERTENSION: ICD-10-CM

## 2022-06-20 DIAGNOSIS — Z12.31 VISIT FOR SCREENING MAMMOGRAM: ICD-10-CM

## 2022-06-20 DIAGNOSIS — Z13.220 LIPID SCREENING: ICD-10-CM

## 2022-06-20 DIAGNOSIS — Z00.00 ROUTINE GENERAL MEDICAL EXAMINATION AT A HEALTH CARE FACILITY: Primary | ICD-10-CM

## 2022-06-20 DIAGNOSIS — E11.9 TYPE 2 DIABETES MELLITUS WITHOUT COMPLICATION, WITHOUT LONG-TERM CURRENT USE OF INSULIN (H): ICD-10-CM

## 2022-06-20 DIAGNOSIS — Z12.11 SCREEN FOR COLON CANCER: ICD-10-CM

## 2022-06-20 DIAGNOSIS — Z12.2 ENCOUNTER FOR SCREENING FOR LUNG CANCER: ICD-10-CM

## 2022-06-20 LAB
CREAT UR-MCNC: 89 MG/DL
ERYTHROCYTE [DISTWIDTH] IN BLOOD BY AUTOMATED COUNT: 13 % (ref 10–15)
HCT VFR BLD AUTO: 42.8 % (ref 35–47)
HGB BLD-MCNC: 13.7 G/DL (ref 11.7–15.7)
MCH RBC QN AUTO: 27.5 PG (ref 26.5–33)
MCHC RBC AUTO-ENTMCNC: 32 G/DL (ref 31.5–36.5)
MCV RBC AUTO: 86 FL (ref 78–100)
MICROALBUMIN UR-MCNC: 7 MG/L
MICROALBUMIN/CREAT UR: 7.87 MG/G CR (ref 0–25)
PLATELET # BLD AUTO: 153 10E3/UL (ref 150–450)
RBC # BLD AUTO: 4.98 10E6/UL (ref 3.8–5.2)
WBC # BLD AUTO: 4.4 10E3/UL (ref 4–11)

## 2022-06-20 PROCEDURE — 90715 TDAP VACCINE 7 YRS/> IM: CPT | Performed by: FAMILY MEDICINE

## 2022-06-20 PROCEDURE — 80061 LIPID PANEL: CPT | Performed by: FAMILY MEDICINE

## 2022-06-20 PROCEDURE — 99214 OFFICE O/P EST MOD 30 MIN: CPT | Mod: 25 | Performed by: FAMILY MEDICINE

## 2022-06-20 PROCEDURE — 90746 HEPB VACCINE 3 DOSE ADULT IM: CPT | Performed by: FAMILY MEDICINE

## 2022-06-20 PROCEDURE — 90472 IMMUNIZATION ADMIN EACH ADD: CPT | Performed by: FAMILY MEDICINE

## 2022-06-20 PROCEDURE — 36415 COLL VENOUS BLD VENIPUNCTURE: CPT | Performed by: FAMILY MEDICINE

## 2022-06-20 PROCEDURE — 90677 PCV20 VACCINE IM: CPT | Performed by: FAMILY MEDICINE

## 2022-06-20 PROCEDURE — 99396 PREV VISIT EST AGE 40-64: CPT | Mod: 25 | Performed by: FAMILY MEDICINE

## 2022-06-20 PROCEDURE — 90471 IMMUNIZATION ADMIN: CPT | Performed by: FAMILY MEDICINE

## 2022-06-20 PROCEDURE — 90750 HZV VACC RECOMBINANT IM: CPT | Performed by: FAMILY MEDICINE

## 2022-06-20 PROCEDURE — 85027 COMPLETE CBC AUTOMATED: CPT | Performed by: FAMILY MEDICINE

## 2022-06-20 PROCEDURE — 82043 UR ALBUMIN QUANTITATIVE: CPT | Performed by: FAMILY MEDICINE

## 2022-06-20 PROCEDURE — 99207 PR FOOT EXAM NO CHARGE: CPT | Mod: 25 | Performed by: FAMILY MEDICINE

## 2022-06-20 PROCEDURE — 80053 COMPREHEN METABOLIC PANEL: CPT | Performed by: FAMILY MEDICINE

## 2022-06-20 ASSESSMENT — ENCOUNTER SYMPTOMS
EYE PAIN: 0
MYALGIAS: 0
NAUSEA: 0
HEADACHES: 0
NERVOUS/ANXIOUS: 0
CONSTIPATION: 0
DIARRHEA: 0
BREAST MASS: 0
HEMATURIA: 0
SORE THROAT: 0
ARTHRALGIAS: 0
PARESTHESIAS: 0
FREQUENCY: 0
HEARTBURN: 0
DIZZINESS: 0
COUGH: 0
DYSURIA: 0
WEAKNESS: 0
ABDOMINAL PAIN: 0
JOINT SWELLING: 0
FEVER: 0
HEMATOCHEZIA: 0
CHILLS: 0
PALPITATIONS: 0
SHORTNESS OF BREATH: 0

## 2022-06-20 NOTE — NURSING NOTE
Prior to immunization administration, verified patients identity using patient s name and date of birth. Please see Immunization Activity for additional information.     Screening Questionnaire for Adult Immunization    Are you sick today?   No   Do you have allergies to medications, food, a vaccine component or latex?   No   Have you ever had a serious reaction after receiving a vaccination?   No   Do you have a long-term health problem with heart, lung, kidney, or metabolic disease (e.g., diabetes), asthma, a blood disorder, no spleen, complement component deficiency, a cochlear implant, or a spinal fluid leak?  Are you on long-term aspirin therapy?   No   Do you have cancer, leukemia, HIV/AIDS, or any other immune system problem?   No   Do you have a parent, brother, or sister with an immune system problem?   No   In the past 3 months, have you taken medications that affect  your immune system, such as prednisone, other steroids, or anticancer drugs; drugs for the treatment of rheumatoid arthritis, Crohn s disease, or psoriasis; or have you had radiation treatments?   No   Have you had a seizure, or a brain or other nervous system problem?   No   During the past year, have you received a transfusion of blood or blood    products, or been given immune (gamma) globulin or antiviral drug?   No   For women: Are you pregnant or is there a chance you could become       pregnant during the next month?   No   Have you received any vaccinations in the past 4 weeks?   No     Immunization questionnaire answers were all negative.        Per orders of Dr. Akers, injection of tdap/shingrix/pneumo 20 given by Amber See MA. Patient instructed to remain in clinic for 15 minutes afterwards, and to report any adverse reaction to me immediately.       Screening performed by Amber See MA on 6/20/2022 at 9:39 AM.

## 2022-06-20 NOTE — PROGRESS NOTES
SUBJECTIVE:   CC: Catia James is an 56 year old woman who presents for preventive health visit.       Patient has been advised of split billing requirements and indicates understanding: Yes  Healthy Habits:     Getting at least 3 servings of Calcium per day:  Yes    Bi-annual eye exam:  Yes    Dental care twice a year:  NO    Sleep apnea or symptoms of sleep apnea:  None    Diet:  Low fat/cholesterol and Diabetic    Frequency of exercise:  6-7 days/week    Duration of exercise:  Greater than 60 minutes    Taking medications regularly:  Yes    Medication side effects:  None    PHQ-2 Total Score: 0    Additional concerns today:  No     Diabetes mellitus II - Doing well. No current symptoms.       Today's PHQ-2 Score:   PHQ-2 (  [employer]) 2022   Q1: Little interest or pleasure in doing things 0   Q2: Feeling down, depressed or hopeless 0   PHQ-2 Score 0   PHQ-2 Total Score (12-17 Years)- Positive if 3 or more points; Administer PHQ-A if positive -   Q1: Little interest or pleasure in doing things Not at all   Q2: Feeling down, depressed or hopeless Not at all   PHQ-2 Score 0       Abuse: Current or Past (Physical, Sexual or Emotional) - No  Do you feel safe in your environment? Yes        Social History     Tobacco Use     Smoking status: Light Tobacco Smoker     Packs/day: 0.50     Years: 25.00     Pack years: 12.50     Types: Cigarettes     Last attempt to quit: 2014     Years since quittin.9     Smokeless tobacco: Never Used   Substance Use Topics     Alcohol use: Yes     Comment: 3 mixed drinks a week     If you drink alcohol do you typically have >3 drinks per day or >7 drinks per week? No    Alcohol Use 2022   Prescreen: >3 drinks/day or >7 drinks/week? No   Prescreen: >3 drinks/day or >7 drinks/week? -       Reviewed orders with patient.  Reviewed health maintenance and updated orders accordingly - Yes      Breast Cancer Screening:    Breast CA Risk Assessment (FHS-7) 2022   Do you  "have a family history of breast, colon, or ovarian cancer? No / Unknown         Pertinent mammograms are reviewed under the imaging tab.    History of abnormal Pap smear: NO - age 30-65 PAP every 5 years with negative HPV co-testing recommended  PAP / HPV Latest Ref Rng & Units 2/12/2020 9/8/2015 1/23/2012   PAP (Historical) - NIL NIL NIL   HPV16 NEG:Negative Negative Negative -   HPV18 NEG:Negative Negative Negative -   HRHPV NEG:Negative Negative Negative -     Reviewed and updated as needed this visit by clinical staff                    Reviewed and updated as needed this visit by Provider                       Review of Systems   Constitutional: Negative for chills and fever.   HENT: Negative for congestion, ear pain, hearing loss and sore throat.    Eyes: Negative for pain and visual disturbance.   Respiratory: Negative for cough and shortness of breath.    Cardiovascular: Negative for chest pain, palpitations and peripheral edema.   Gastrointestinal: Negative for abdominal pain, constipation, diarrhea, heartburn, hematochezia and nausea.   Breasts:  Negative for tenderness, breast mass and discharge.   Genitourinary: Negative for dysuria, frequency, genital sores, hematuria, pelvic pain, urgency, vaginal bleeding and vaginal discharge.   Musculoskeletal: Negative for arthralgias, joint swelling and myalgias.   Skin: Negative for rash.   Neurological: Negative for dizziness, weakness, headaches and paresthesias.   Psychiatric/Behavioral: Negative for mood changes. The patient is not nervous/anxious.        OBJECTIVE:   Physical Exam   /80 (BP Location: Right arm, Patient Position: Sitting, Cuff Size: Adult Regular)   Pulse 69   Temp 97.3  F (36.3  C) (Temporal)   Resp 20   Ht 1.6 m (5' 3\")   Wt 78.7 kg (173 lb 9.6 oz)   LMP 08/03/2015   SpO2 98%   BMI 30.75 kg/m    GENERAL APPEARANCE: healthy, alert and no distress  EYES: Eyes grossly normal to inspection,  conjunctivae and sclerae normal  HENT: " ear canals and TM's normal  NECK: no adenopathy, no asymmetry, masses, or scars and thyroid normal to palpation  RESP: lungs clear to auscultation - no rales, rhonchi or wheezes  BREAST: normal without masses, tenderness or nipple discharge and no palpable axillary masses or adenopathy  CV: regular rate and rhythm, normal S1 S2  ABDOMEN: soft, nontender, no hepatosplenomegaly, no masses and bowel sounds normal  MS: no musculoskeletal defects are noted and gait is age appropriate without ataxia  SKIN: no suspicious lesions or rashes  NEURO: Normal strength and tone, sensory exam grossly normal, mentation intact and speech normal  DIABETIC FOOT EXAM: normal DP and PT pulses, no trophic changes or ulcerative lesions and normal sensory exam  PSYCH: mentation appears normal and affect normal/bright    Diagnostic Test Results:  Labs reviewed in Epic    ASSESSMENT/PLAN:     Routine general medical examination at a health care facility  - COVID booster at pharmacy  - Received hepatitis B vaccine  - Pneumococcal 20 Valent Conjugate (Prevnar 20)  - TDAP VACCINE (Adacel, Boostrix)  - ZOSTER VACCINE RECOMBINANT ADJUVANTED (SHINGRIX)    Type 2 diabetes mellitus without complication, without long-term current use of insulin (H)  - Diet controlled  - Up to date with eye exam  - FOOT EXAM  - Albumin Random Urine Quantitative with Creat Ratio; Future  - Albumin Random Urine Quantitative with Creat Ratio    Essential hypertension  - Congratulated Catia on weight loss.   - Advised below  Blood pressure -   Lisinopril 20 mg daily   Check home blood pressure every other day   Goal blood pressure is less than 140/90  Message me with an update of your blood pressures in one month.   - CBC with platelets; Future  - Comprehensive metabolic panel (BMP + Alb, Alk Phos, ALT, AST, Total. Bili, TP); Future  - Albumin Random Urine Quantitative with Creat Ratio; Future  - CBC with platelets  - Comprehensive metabolic panel (BMP + Alb, Alk Phos,  "ALT, AST, Total. Bili, TP)  - Albumin Random Urine Quantitative with Creat Ratio    Screen for colon cancer  - COLOGUARD([employer])    Visit for screening mammogram  - MA SCREENING DIGITAL BILAT - Future  (s+30); Future    Lipid screening  - Lipid Profile (Chol, Trig, HDL, LDL calc); Future  - Lipid Profile (Chol, Trig, HDL, LDL calc)    Encounter for screening for lung cancer  - Does not qualify for below guidelines, smoked 42 years, pack per year 4.2  - current guidelines: For adults age 50 to 80 years old who are at risk of lung cancer due to smoking (at least a 20 pack-year smoking history and are either current smokers or former smokers having quit within the past 15 years),         Patient has been advised of split billing requirements and indicates understanding: Yes    COUNSELING:  Reviewed preventive health counseling, as reflected in patient instructions    Estimated body mass index is 34.27 kg/m  as calculated from the following:    Height as of 2/12/20: 1.607 m (5' 3.25\").    Weight as of 10/7/21: 88.5 kg (195 lb).    Weight management plan: continue lifestyle changes    She reports that she has been smoking cigarettes. She has a 12.50 pack-year smoking history. She has never used smokeless tobacco.  Tobacco Cessation Action Plan: not discussed       Counseling Resources:  ATP IV Guidelines  Pooled Cohorts Equation Calculator  Breast Cancer Risk Calculator  BRCA-Related Cancer Risk Assessment: FHS-7 Tool  FRAX Risk Assessment  ICSI Preventive Guidelines  Dietary Guidelines for Americans, 2010  USDA's MyPlate  ASA Prophylaxis  Lung CA Screening    Aguilar Akers MD  [facility]  "

## 2022-06-20 NOTE — PATIENT INSTRUCTIONS
Preventive Health Recommendations  Female Ages 50 - 64    Yearly exam: See your health care provider every year in order to  Review health changes.   Discuss preventive care.    Review your medicines if your doctor has prescribed any.    Get a Pap test every three years (unless you have an abnormal result and your provider advises testing more often).  If you get Pap tests with HPV test, you only need to test every 5 years, unless you have an abnormal result.   You do not need a Pap test if your uterus was removed (hysterectomy) and you have not had cancer.  You should be tested each year for STDs (sexually transmitted diseases) if you're at risk.   Have a mammogram every 1 to 2 years.  Have a colonoscopy at age 50, or have a yearly FIT test (stool test). These exams screen for colon cancer.    Have a cholesterol test every 5 years, or more often if advised.  Have a diabetes test (fasting glucose) every three years. If you are at risk for diabetes, you should have this test more often.   If you are at risk for osteoporosis (brittle bone disease), think about having a bone density scan (DEXA).    Shots: Get a flu shot each year. Get a tetanus shot every 10 years.  Blood pressure -   Lisinopril 20 mg daily   Check home blood pressure every other day   Goal blood pressure is less than 140/90  Message me with an update of your blood pressures in one month.           Nutrition:   Eat at least 5 servings of fruits and vegetables each day.  Eat whole-grain bread, whole-wheat pasta and brown rice instead of white grains and rice.  Get adequate Calcium and Vitamin D.     Lifestyle  Exercise at least 150 minutes a week (30 minutes a day, 5 days a week). This will help you control your weight and prevent disease.  Limit alcohol to one drink per day.  No smoking.   Wear sunscreen to prevent skin cancer.   See your dentist every six months for an exam and cleaning.  See your eye doctor every 1 to 2 years.

## 2022-06-22 LAB
ALBUMIN SERPL-MCNC: 4 G/DL (ref 3.4–5)
ALP SERPL-CCNC: 70 U/L (ref 40–150)
ALT SERPL W P-5'-P-CCNC: 51 U/L (ref 0–50)
ANION GAP SERPL CALCULATED.3IONS-SCNC: 6 MMOL/L (ref 3–14)
AST SERPL W P-5'-P-CCNC: 38 U/L (ref 0–45)
BILIRUB SERPL-MCNC: 0.4 MG/DL (ref 0.2–1.3)
BUN SERPL-MCNC: 16 MG/DL (ref 7–30)
CALCIUM SERPL-MCNC: 9.5 MG/DL (ref 8.5–10.1)
CHLORIDE BLD-SCNC: 109 MMOL/L (ref 94–109)
CHOLEST SERPL-MCNC: 160 MG/DL
CO2 SERPL-SCNC: 24 MMOL/L (ref 20–32)
CREAT SERPL-MCNC: 0.57 MG/DL (ref 0.52–1.04)
FASTING STATUS PATIENT QL REPORTED: NORMAL
GFR SERPL CREATININE-BSD FRML MDRD: >90 ML/MIN/1.73M2
GLUCOSE BLD-MCNC: 89 MG/DL (ref 70–99)
HDLC SERPL-MCNC: 59 MG/DL
LDLC SERPL CALC-MCNC: 86 MG/DL
NONHDLC SERPL-MCNC: 101 MG/DL
POTASSIUM BLD-SCNC: 5.2 MMOL/L (ref 3.4–5.3)
PROT SERPL-MCNC: 7.6 G/DL (ref 6.8–8.8)
SODIUM SERPL-SCNC: 139 MMOL/L (ref 133–144)
TRIGL SERPL-MCNC: 75 MG/DL

## 2022-07-13 ENCOUNTER — HOSPITAL ENCOUNTER (OUTPATIENT)
Dept: MAMMOGRAPHY | Facility: CLINIC | Age: 57
Discharge: HOME | End: 2022-07-13
Attending: FAMILY MEDICINE | Admitting: FAMILY MEDICINE
Payer: COMMERCIAL

## 2022-07-13 DIAGNOSIS — Z12.31 VISIT FOR SCREENING MAMMOGRAM: ICD-10-CM

## 2022-07-13 PROCEDURE — 77067 SCR MAMMO BI INCL CAD: CPT

## 2022-10-16 ENCOUNTER — HEALTH MAINTENANCE LETTER (OUTPATIENT)
Age: 57
End: 2022-10-16

## 2023-03-26 ENCOUNTER — HEALTH MAINTENANCE LETTER (OUTPATIENT)
Age: 58
End: 2023-03-26

## 2023-05-22 ENCOUNTER — PATIENT OUTREACH (OUTPATIENT)
Dept: CARE COORDINATION | Facility: CLINIC | Age: 58
End: 2023-05-22
Payer: COMMERCIAL

## 2023-06-06 ENCOUNTER — PATIENT OUTREACH (OUTPATIENT)
Dept: CARE COORDINATION | Facility: CLINIC | Age: 58
End: 2023-06-06
Payer: COMMERCIAL

## 2023-08-26 ENCOUNTER — HEALTH MAINTENANCE LETTER (OUTPATIENT)
Age: 58
End: 2023-08-26

## 2023-11-09 ENCOUNTER — MYC REFILL (OUTPATIENT)
Dept: FAMILY MEDICINE | Facility: CLINIC | Age: 58
End: 2023-11-09
Payer: COMMERCIAL

## 2023-11-09 DIAGNOSIS — I10 ESSENTIAL HYPERTENSION: ICD-10-CM

## 2023-11-10 RX ORDER — LISINOPRIL 40 MG/1
40 TABLET ORAL DAILY
Qty: 90 TABLET | Refills: 0 | Status: SHIPPED | OUTPATIENT
Start: 2023-11-10 | End: 2024-07-18 | Stop reason: DRUGHIGH

## 2024-01-13 ENCOUNTER — HEALTH MAINTENANCE LETTER (OUTPATIENT)
Age: 59
End: 2024-01-13

## 2024-06-01 ENCOUNTER — HEALTH MAINTENANCE LETTER (OUTPATIENT)
Age: 59
End: 2024-06-01

## 2024-07-17 SDOH — HEALTH STABILITY: PHYSICAL HEALTH: ON AVERAGE, HOW MANY MINUTES DO YOU ENGAGE IN EXERCISE AT THIS LEVEL?: 30 MIN

## 2024-07-17 SDOH — HEALTH STABILITY: PHYSICAL HEALTH: ON AVERAGE, HOW MANY DAYS PER WEEK DO YOU ENGAGE IN MODERATE TO STRENUOUS EXERCISE (LIKE A BRISK WALK)?: 4 DAYS

## 2024-07-17 ASSESSMENT — SOCIAL DETERMINANTS OF HEALTH (SDOH): HOW OFTEN DO YOU GET TOGETHER WITH FRIENDS OR RELATIVES?: MORE THAN THREE TIMES A WEEK

## 2024-07-18 ENCOUNTER — OFFICE VISIT (OUTPATIENT)
Dept: FAMILY MEDICINE | Facility: CLINIC | Age: 59
End: 2024-07-18
Payer: COMMERCIAL

## 2024-07-18 ENCOUNTER — ORDERS ONLY (AUTO-RELEASED) (OUTPATIENT)
Dept: FAMILY MEDICINE | Facility: CLINIC | Age: 59
End: 2024-07-18

## 2024-07-18 VITALS
RESPIRATION RATE: 18 BRPM | HEART RATE: 67 BPM | TEMPERATURE: 97.2 F | OXYGEN SATURATION: 97 % | WEIGHT: 191.1 LBS | HEIGHT: 63 IN | SYSTOLIC BLOOD PRESSURE: 126 MMHG | DIASTOLIC BLOOD PRESSURE: 85 MMHG | BODY MASS INDEX: 33.86 KG/M2

## 2024-07-18 DIAGNOSIS — Z23 NEED FOR HEPATITIS B VACCINATION: ICD-10-CM

## 2024-07-18 DIAGNOSIS — Z12.11 SCREEN FOR COLON CANCER: ICD-10-CM

## 2024-07-18 DIAGNOSIS — I10 HYPERTENSION, GOAL BELOW 140/90: ICD-10-CM

## 2024-07-18 DIAGNOSIS — E11.9 TYPE 2 DIABETES MELLITUS WITHOUT COMPLICATION, WITHOUT LONG-TERM CURRENT USE OF INSULIN (H): ICD-10-CM

## 2024-07-18 DIAGNOSIS — E66.811 CLASS 1 OBESITY WITH SERIOUS COMORBIDITY AND BODY MASS INDEX (BMI) OF 33.0 TO 33.9 IN ADULT, UNSPECIFIED OBESITY TYPE: ICD-10-CM

## 2024-07-18 DIAGNOSIS — Z12.31 VISIT FOR SCREENING MAMMOGRAM: ICD-10-CM

## 2024-07-18 DIAGNOSIS — Z00.00 ROUTINE GENERAL MEDICAL EXAMINATION AT A HEALTH CARE FACILITY: ICD-10-CM

## 2024-07-18 LAB
ANION GAP SERPL CALCULATED.3IONS-SCNC: 8 MMOL/L (ref 7–15)
BUN SERPL-MCNC: 12.9 MG/DL (ref 8–23)
CALCIUM SERPL-MCNC: 9.5 MG/DL (ref 8.8–10.4)
CHLORIDE SERPL-SCNC: 104 MMOL/L (ref 98–107)
CHOLEST SERPL-MCNC: 163 MG/DL
CREAT SERPL-MCNC: 0.61 MG/DL (ref 0.51–0.95)
CREAT UR-MCNC: 49.1 MG/DL
EGFRCR SERPLBLD CKD-EPI 2021: >90 ML/MIN/1.73M2
FASTING STATUS PATIENT QL REPORTED: YES
FASTING STATUS PATIENT QL REPORTED: YES
GLUCOSE SERPL-MCNC: 139 MG/DL (ref 70–99)
HBA1C MFR BLD: 6.5 % (ref 0–5.6)
HCO3 SERPL-SCNC: 26 MMOL/L (ref 22–29)
HDLC SERPL-MCNC: 50 MG/DL
LDLC SERPL CALC-MCNC: 94 MG/DL
MICROALBUMIN UR-MCNC: <12 MG/L
MICROALBUMIN/CREAT UR: NORMAL MG/G{CREAT}
NONHDLC SERPL-MCNC: 113 MG/DL
POTASSIUM SERPL-SCNC: 4.3 MMOL/L (ref 3.4–5.3)
SODIUM SERPL-SCNC: 138 MMOL/L (ref 135–145)
TRIGL SERPL-MCNC: 93 MG/DL
TSH SERPL DL<=0.005 MIU/L-ACNC: 1.16 UIU/ML (ref 0.3–4.2)

## 2024-07-18 PROCEDURE — 99396 PREV VISIT EST AGE 40-64: CPT | Mod: 25 | Performed by: FAMILY MEDICINE

## 2024-07-18 PROCEDURE — 90746 HEPB VACCINE 3 DOSE ADULT IM: CPT | Performed by: FAMILY MEDICINE

## 2024-07-18 PROCEDURE — 90471 IMMUNIZATION ADMIN: CPT | Performed by: FAMILY MEDICINE

## 2024-07-18 PROCEDURE — 80048 BASIC METABOLIC PNL TOTAL CA: CPT | Performed by: FAMILY MEDICINE

## 2024-07-18 PROCEDURE — 99214 OFFICE O/P EST MOD 30 MIN: CPT | Mod: 25 | Performed by: FAMILY MEDICINE

## 2024-07-18 PROCEDURE — 82043 UR ALBUMIN QUANTITATIVE: CPT | Performed by: FAMILY MEDICINE

## 2024-07-18 PROCEDURE — 82570 ASSAY OF URINE CREATININE: CPT | Performed by: FAMILY MEDICINE

## 2024-07-18 PROCEDURE — 80061 LIPID PANEL: CPT | Performed by: FAMILY MEDICINE

## 2024-07-18 PROCEDURE — 83036 HEMOGLOBIN GLYCOSYLATED A1C: CPT | Performed by: FAMILY MEDICINE

## 2024-07-18 PROCEDURE — 84443 ASSAY THYROID STIM HORMONE: CPT | Performed by: FAMILY MEDICINE

## 2024-07-18 PROCEDURE — 36415 COLL VENOUS BLD VENIPUNCTURE: CPT | Performed by: FAMILY MEDICINE

## 2024-07-18 RX ORDER — LISINOPRIL 20 MG/1
20 TABLET ORAL DAILY
Qty: 90 TABLET | Refills: 3 | Status: SHIPPED | OUTPATIENT
Start: 2024-07-18

## 2024-07-18 ASSESSMENT — PAIN SCALES - GENERAL: PAINLEVEL: NO PAIN (0)

## 2024-07-18 NOTE — NURSING NOTE
Prior to immunization administration, verified patients identity using patient s name and date of birth. Please see Immunization Activity for additional information.     Screening Questionnaire for Adult Immunization    Are you sick today?   No   Do you have allergies to medications, food, a vaccine component or latex?   No   Have you ever had a serious reaction after receiving a vaccination?   No   Do you have a long-term health problem with heart, lung, kidney, or metabolic disease (e.g., diabetes), asthma, a blood disorder, no spleen, complement component deficiency, a cochlear implant, or a spinal fluid leak?  Are you on long-term aspirin therapy?   No   Do you have cancer, leukemia, HIV/AIDS, or any other immune system problem?   No   Do you have a parent, brother, or sister with an immune system problem?   No   In the past 3 months, have you taken medications that affect  your immune system, such as prednisone, other steroids, or anticancer drugs; drugs for the treatment of rheumatoid arthritis, Crohn s disease, or psoriasis; or have you had radiation treatments?   No   Have you had a seizure, or a brain or other nervous system problem?   No   During the past year, have you received a transfusion of blood or blood    products, or been given immune (gamma) globulin or antiviral drug?   No   For women: Are you pregnant or is there a chance you could become       pregnant during the next month?   No   Have you received any vaccinations in the past 4 weeks?   No     Immunization questionnaire answers were all negative.      Patient instructed to remain in clinic for 15 minutes afterwards, and to report any adverse reactions.     Screening performed by Amber See MA on 7/18/2024 at 10:27 AM.

## 2024-07-18 NOTE — PROGRESS NOTES
Preventive Care Visit  [facility]  Aguilar Akers MD, Family Medicine  Jul 18, 2024      Assessment & Plan     Routine general medical examination at a health care facility  - REVIEW OF HEALTH MAINTENANCE PROTOCOL ORDERS  - PRIMARY CARE FOLLOW-UP SCHEDULING; Future  - declined dermatology   - lung cancer screening   - Does not qualify for below guidelines, smoked 42 years, pack per year 4.2  - current guidelines: For adults age 50 to 80 years old who are at risk of lung cancer due to smoking (at least a 20 pack-year smoking history and are either current smokers or former smokers having quit within the past 15 years),     Type 2 diabetes mellitus without complication, without long-term current use of insulin (H)  - diet controlled  - will [facility] and will sign [place] to have records faxed   - HEMOGLOBIN A1C; Future  - Lipid panel reflex to direct LDL Fasting; Future  - Albumin Random Urine Quantitative with Creat Ratio; Future  - FOOT EXAM  - TSH with free T4 reflex; Future  - HEMOGLOBIN A1C  - Lipid panel reflex to direct LDL Fasting  - Albumin Random Urine Quantitative with Creat Ratio  - TSH with free T4 reflex    Hypertension, goal below 140/90  - controlled, currently taking lisinopril 20 mg daily and not lisinopril 40 mg daily   - BASIC METABOLIC PANEL; Future  - lisinopril (ZESTRIL) 20 MG tablet; Take 1 tablet (20 mg) by mouth daily  - BASIC METABOLIC PANEL    Class 1 obesity with serious comorbidity and body mass index (BMI) of 33.0 to 33.9 in adult, unspecified obesity type  - discussed lifestyle changes including increasing activity  - declined weight management clinic     Screen for colon cancer  - CHLOE([employer]); Future    Visit for screening mammogram  - MA Screening Bilateral w/ Kashif; Future    Need for hepatitis B vaccination  - HEPATITIS B, ADULT 20+ (ENGERIX-B/RECOMBIVAX HB)            Nicotine/Tobacco Cessation  She reports that  "she has been smoking cigarettes. She has a 4.2 pack-year smoking history. She has never used smokeless tobacco.  Nicotine/Tobacco Cessation Plan  Not discussed       BMI  Estimated body mass index is 33.65 kg/m  as calculated from the following:    Height as of this encounter: 1.605 m (5' 3.19\").    Weight as of this encounter: 86.7 kg (191 lb 1.6 oz).       Counseling  Appropriate preventive services were addressed with this patient via screening, questionnaire, or discussion as appropriate for fall prevention, nutrition, physical activity, Tobacco-use cessation, weight loss and cognition.  Checklist reviewing preventive services available has been given to the patient.  Reviewed patient's diet, addressing concerns and/or questions.   The patient was instructed to see the dentist every 6 months.   She is at risk for psychosocial distress and has been provided with information to reduce risk.           Dione Bardalse is a 58 year old, presenting for the following:  Physical, Diabetes, and Hypertension        7/18/2024     9:25 AM   Additional Questions   Roomed by Parisa KRUGER   Accompanied by self        Health Care Directive  Patient does not have a Health Care Directive or Living Will: Discussed advance care planning with patient; information given to patient to review.    HPI    She is currently taking lisinopril 20 mg daily.               7/17/2024   General Health   How would you rate your overall physical health? Good   Feel stress (tense, anxious, or unable to sleep) Only a little      (!) STRESS CONCERN      7/17/2024   Nutrition   Three or more servings of calcium each day? Yes   Diet: Regular (no restrictions)   How many servings of fruit and vegetables per day? (!) 2-3   How many sweetened beverages each day? 0-1            7/17/2024   Exercise   Days per week of moderate/strenous exercise 4 days   Average minutes spent exercising at this level 30 min            7/17/2024   Social Factors   Frequency of " gathering with friends or relatives More than three times a week   Worry food won't last until get money to buy more No   Food not last or not have enough money for food? No   Do you have housing? (Housing is defined as stable permanent housing and does not include staying ouside in a car, in a tent, in an abandoned building, in an overnight shelter, or couch-surfing.) Yes   Are you worried about losing your housing? No   Lack of transportation? No   Unable to get utilities (heat,electricity)? No            7/17/2024   Fall Risk   Fallen 2 or more times in the past year? No   Trouble with walking or balance? No             7/17/2024   Dental   Dentist two times every year? (!) NO            7/17/2024   TB Screening   Were you born outside of the [place]? No            Today's PHQ-2 Score:       7/17/2024    10:05 AM   PHQ-2 ( 1999 [employer])   Q1: Little interest or pleasure in doing things 0   Q2: Feeling down, depressed or hopeless 0   PHQ-2 Score 0   Q1: Little interest or pleasure in doing things Not at all   Q2: Feeling down, depressed or hopeless Not at all   PHQ-2 Score 0           7/17/2024   Substance Use   Alcohol more than 3/day or more than 7/wk No   Do you use any other substances recreationally? No        Social History     Tobacco Use    Smoking status: Light Smoker     Current packs/day: 0.10     Average packs/day: 0.1 packs/day for 42.0 years (4.2 ttl pk-yrs)     Types: Cigarettes    Smokeless tobacco: Never   Substance Use Topics    Alcohol use: Yes     Comment: 3 mixed drinks a week    Drug use: No           7/13/2022   LAST FHS-7 RESULTS   1st degree relative breast or ovarian cancer No   Any relative bilateral breast cancer No   Any male have breast cancer No   Any ONE woman have BOTH breast AND ovarian cancer No   Any woman with breast cancer before 50yrs No   2 or more relatives with breast AND/OR ovarian cancer No   2 or more relatives with breast AND/OR bowel cancer No                   7/17/2024  "  STI Screening   New sexual partner(s) since last STI/HIV test? No        History of abnormal Pap smear: No - age 30- 64 PAP with HPV every 5 years recommended        Latest Ref Rng & Units 2/12/2020     8:32 AM 2/12/2020     8:31 AM 9/8/2015    11:53 AM   PAP / HPV   PAP (Historical)  NIL      HPV 16 DNA NEG^Negative  Negative  Negative    HPV 18 DNA NEG^Negative  Negative  Negative    Other HR HPV NEG^Negative  Negative  Negative      ASCVD Risk   The ASCVD Risk score (Lupis CAMACHO, et al., 2019) failed to calculate for the following reasons:    The systolic blood pressure is missing           Reviewed and updated as needed this visit by Provider                             Objective    Exam  LMP 08/03/2015    Estimated body mass index is 30.75 kg/m  as calculated from the following:    Height as of 6/20/22: 1.6 m (5' 3\").    Weight as of 6/20/22: 78.7 kg (173 lb 9.6 oz).  /85 (BP Location: Right arm, Patient Position: Sitting, Cuff Size: Adult Regular)   Pulse 67   Temp 97.2  F (36.2  C) (Temporal)   Resp 18   Ht 1.605 m (5' 3.19\")   Wt 86.7 kg (191 lb 1.6 oz)   LMP 08/03/2015   SpO2 97%   BMI 33.65 kg/m     Physical Exam  GENERAL: alert and no distress  EYES: Eyes grossly normal to inspection  HENT: ear canals and TM's normal  NECK: no adenopathy, no asymmetry, masses, or scars  RESP: lungs clear to auscultation - no rales, rhonchi or wheezes  CV: regular rate and rhythm, normal S1 S2  ABDOMEN: soft, nontender, no hepatosplenomegaly, no masses and bowel sounds normal  MS: no gross musculoskeletal defects noted, no edema  SKIN: no suspicious lesions or rashes  NEURO: Normal strength and tone, mentation intact and speech normal  PSYCH: mentation appears normal, affect normal/bright  Diabetic foot exam: normal DP and PT pulses, no trophic changes or ulcerative lesions, and normal sensory exam        Signed Electronically by: Aguilar Akers MD    "

## 2024-07-18 NOTE — PATIENT INSTRUCTIONS
Patient Education   Preventive Care Advice   This is general advice given by our system to help you stay healthy. However, your care team may have specific advice just for you. Please talk to your care team about your preventive care needs.  Nutrition  Eat 5 or more servings of fruits and vegetables each day.  Try wheat bread, brown rice and whole grain pasta (instead of white bread, rice, and pasta).  Get enough calcium and vitamin D. Check the label on foods and aim for 100% of the RDA (recommended daily allowance).  Lifestyle  Exercise at least 150 minutes each week  (30 minutes a day, 5 days a week).  Do muscle strengthening activities 2 days a week. These help control your weight and prevent disease.  No smoking.  Wear sunscreen to prevent skin cancer.  Have a dental exam and cleaning every 6 months.  Yearly exams  See your health care team every year to talk about:  Any changes in your health.  Any medicines your care team has prescribed.  Preventive care, family planning, and ways to prevent chronic diseases.  Shots (vaccines)   HPV shots (up to age 26), if you've never had them before.  Hepatitis B shots (up to age 59), if you've never had them before.  COVID-19 shot: Get this shot when it's due.  Flu shot: Get a flu shot every year.  Tetanus shot: Get a tetanus shot every 10 years.  Pneumococcal, hepatitis A, and RSV shots: Ask your care team if you need these based on your risk.  Shingles shot (for age 50 and up)  General health tests  Diabetes screening:  Starting at age 35, Get screened for diabetes at least every 3 years.  If you are younger than age 35, ask your care team if you should be screened for diabetes.  Cholesterol test: At age 39, start having a cholesterol test every 5 years, or more often if advised.  Bone density scan (DEXA): At age 50, ask your care team if you should have this scan for osteoporosis (brittle bones).  Hepatitis C: Get tested at least once in your life.  STIs (sexually  transmitted infections)  Before age 24: Ask your care team if you should be screened for STIs.  After age 24: Get screened for STIs if you're at risk. You are at risk for STIs (including HIV) if:  You are sexually active with more than one person.  You don't use condoms every time.  You or a partner was diagnosed with a sexually transmitted infection.  If you are at risk for HIV, ask about PrEP medicine to prevent HIV.  Get tested for HIV at least once in your life, whether you are at risk for HIV or not.  Cancer screening tests  Cervical cancer screening: If you have a cervix, begin getting regular cervical cancer screening tests starting at age 21.  Breast cancer scan (mammogram): If you've ever had breasts, begin having regular mammograms starting at age 40. This is a scan to check for breast cancer.  Colon cancer screening: It is important to start screening for colon cancer at age 45.  Have a colonoscopy test every 10 years (or more often if you're at risk) Or, ask your provider about stool tests like a FIT test every year or Cologuard test every 3 years.  To learn more about your testing options, visit:   .  For help making a decision, visit:   https://bit.ly/ns28221.  Prostate cancer screening test: If you have a prostate, ask your care team if a prostate cancer screening test (PSA) at age 55 is right for you.  Lung cancer screening: If you are a current or former smoker ages 50 to 80, ask your care team if ongoing lung cancer screenings are right for you.  For informational purposes only. Not to replace the advice of your health care provider. Copyright   2023 [employer] All rights reserved. Clinically reviewed by the [facility] Transitions Program. [employer] 660050 - REV 01/24.

## 2024-09-03 LAB — NONINV COLON CA DNA+OCC BLD SCRN STL QL: NEGATIVE

## 2024-10-19 ENCOUNTER — HEALTH MAINTENANCE LETTER (OUTPATIENT)
Age: 59
End: 2024-10-19

## 2025-01-25 ENCOUNTER — HEALTH MAINTENANCE LETTER (OUTPATIENT)
Age: 60
End: 2025-01-25

## 2025-05-03 ENCOUNTER — HEALTH MAINTENANCE LETTER (OUTPATIENT)
Age: 60
End: 2025-05-03

## 2025-06-18 ENCOUNTER — PATIENT OUTREACH (OUTPATIENT)
Dept: CARE COORDINATION | Facility: CLINIC | Age: 60
End: 2025-06-18
Payer: COMMERCIAL

## 2025-07-02 ENCOUNTER — PATIENT OUTREACH (OUTPATIENT)
Dept: CARE COORDINATION | Facility: CLINIC | Age: 60
End: 2025-07-02
Payer: COMMERCIAL

## 2025-08-16 ENCOUNTER — HEALTH MAINTENANCE LETTER (OUTPATIENT)
Age: 60
End: 2025-08-16